# Patient Record
Sex: MALE | Race: ASIAN | NOT HISPANIC OR LATINO | URBAN - METROPOLITAN AREA
[De-identification: names, ages, dates, MRNs, and addresses within clinical notes are randomized per-mention and may not be internally consistent; named-entity substitution may affect disease eponyms.]

---

## 2020-03-29 ENCOUNTER — INPATIENT (INPATIENT)
Facility: HOSPITAL | Age: 50
LOS: 14 days | Discharge: HOME HEALTH | End: 2020-04-13
Attending: HOSPITALIST | Admitting: HOSPITALIST
Payer: COMMERCIAL

## 2020-03-29 VITALS
RESPIRATION RATE: 20 BRPM | TEMPERATURE: 100 F | SYSTOLIC BLOOD PRESSURE: 142 MMHG | HEART RATE: 102 BPM | OXYGEN SATURATION: 98 % | DIASTOLIC BLOOD PRESSURE: 72 MMHG

## 2020-03-29 DIAGNOSIS — E78.5 HYPERLIPIDEMIA, UNSPECIFIED: ICD-10-CM

## 2020-03-29 DIAGNOSIS — E87.1 HYPO-OSMOLALITY AND HYPONATREMIA: ICD-10-CM

## 2020-03-29 DIAGNOSIS — E11.9 TYPE 2 DIABETES MELLITUS WITHOUT COMPLICATIONS: ICD-10-CM

## 2020-03-29 DIAGNOSIS — B34.9 VIRAL INFECTION, UNSPECIFIED: ICD-10-CM

## 2020-03-29 LAB
ALBUMIN SERPL ELPH-MCNC: 4.3 G/DL — SIGNIFICANT CHANGE UP (ref 3.5–5.2)
ALP SERPL-CCNC: 65 U/L — SIGNIFICANT CHANGE UP (ref 30–115)
ALT FLD-CCNC: 37 U/L — SIGNIFICANT CHANGE UP (ref 0–41)
ANION GAP SERPL CALC-SCNC: 13 MMOL/L — SIGNIFICANT CHANGE UP (ref 7–14)
APPEARANCE UR: CLEAR — SIGNIFICANT CHANGE UP
APTT BLD: 35.2 SEC — SIGNIFICANT CHANGE UP (ref 27–39.2)
AST SERPL-CCNC: 39 U/L — SIGNIFICANT CHANGE UP (ref 0–41)
BASOPHILS # BLD AUTO: 0.01 K/UL — SIGNIFICANT CHANGE UP (ref 0–0.2)
BASOPHILS NFR BLD AUTO: 0.2 % — SIGNIFICANT CHANGE UP (ref 0–1)
BILIRUB SERPL-MCNC: 0.4 MG/DL — SIGNIFICANT CHANGE UP (ref 0.2–1.2)
BILIRUB UR-MCNC: NEGATIVE — SIGNIFICANT CHANGE UP
BUN SERPL-MCNC: 13 MG/DL — SIGNIFICANT CHANGE UP (ref 10–20)
CALCIUM SERPL-MCNC: 8.8 MG/DL — SIGNIFICANT CHANGE UP (ref 8.5–10.1)
CHLORIDE SERPL-SCNC: 89 MMOL/L — LOW (ref 98–110)
CO2 SERPL-SCNC: 26 MMOL/L — SIGNIFICANT CHANGE UP (ref 17–32)
COLOR SPEC: YELLOW — SIGNIFICANT CHANGE UP
CREAT SERPL-MCNC: 1 MG/DL — SIGNIFICANT CHANGE UP (ref 0.7–1.5)
DIFF PNL FLD: NEGATIVE — SIGNIFICANT CHANGE UP
EOSINOPHIL # BLD AUTO: 0 K/UL — SIGNIFICANT CHANGE UP (ref 0–0.7)
EOSINOPHIL NFR BLD AUTO: 0 % — SIGNIFICANT CHANGE UP (ref 0–8)
FLU A RESULT: NEGATIVE — SIGNIFICANT CHANGE UP
FLU A RESULT: NEGATIVE — SIGNIFICANT CHANGE UP
FLUAV AG NPH QL: NEGATIVE — SIGNIFICANT CHANGE UP
FLUBV AG NPH QL: NEGATIVE — SIGNIFICANT CHANGE UP
GLUCOSE BLDC GLUCOMTR-MCNC: 173 MG/DL — HIGH (ref 70–99)
GLUCOSE BLDC GLUCOMTR-MCNC: 195 MG/DL — HIGH (ref 70–99)
GLUCOSE BLDC GLUCOMTR-MCNC: 266 MG/DL — HIGH (ref 70–99)
GLUCOSE SERPL-MCNC: 234 MG/DL — HIGH (ref 70–99)
GLUCOSE UR QL: 500 MG/DL
HCT VFR BLD CALC: 44 % — SIGNIFICANT CHANGE UP (ref 42–52)
HGB BLD-MCNC: 13.6 G/DL — LOW (ref 14–18)
IMM GRANULOCYTES NFR BLD AUTO: 0.4 % — HIGH (ref 0.1–0.3)
INR BLD: 1.21 RATIO — SIGNIFICANT CHANGE UP (ref 0.65–1.3)
KETONES UR-MCNC: 40
LEUKOCYTE ESTERASE UR-ACNC: NEGATIVE — SIGNIFICANT CHANGE UP
LYMPHOCYTES # BLD AUTO: 0.51 K/UL — LOW (ref 1.2–3.4)
LYMPHOCYTES # BLD AUTO: 10.9 % — LOW (ref 20.5–51.1)
MCHC RBC-ENTMCNC: 19.6 PG — LOW (ref 27–31)
MCHC RBC-ENTMCNC: 30.9 G/DL — LOW (ref 32–37)
MCV RBC AUTO: 63.5 FL — LOW (ref 80–94)
MONOCYTES # BLD AUTO: 0.31 K/UL — SIGNIFICANT CHANGE UP (ref 0.1–0.6)
MONOCYTES NFR BLD AUTO: 6.6 % — SIGNIFICANT CHANGE UP (ref 1.7–9.3)
NEUTROPHILS # BLD AUTO: 3.84 K/UL — SIGNIFICANT CHANGE UP (ref 1.4–6.5)
NEUTROPHILS NFR BLD AUTO: 81.9 % — HIGH (ref 42.2–75.2)
NITRITE UR-MCNC: NEGATIVE — SIGNIFICANT CHANGE UP
NRBC # BLD: 0 /100 WBCS — SIGNIFICANT CHANGE UP (ref 0–0)
NT-PROBNP SERPL-SCNC: 29 PG/ML — SIGNIFICANT CHANGE UP (ref 0–300)
PH UR: 6.5 — SIGNIFICANT CHANGE UP (ref 5–8)
PLATELET # BLD AUTO: 121 K/UL — LOW (ref 130–400)
POTASSIUM SERPL-MCNC: 4.6 MMOL/L — SIGNIFICANT CHANGE UP (ref 3.5–5)
POTASSIUM SERPL-SCNC: 4.6 MMOL/L — SIGNIFICANT CHANGE UP (ref 3.5–5)
PROT SERPL-MCNC: 7.2 G/DL — SIGNIFICANT CHANGE UP (ref 6–8)
PROT UR-MCNC: NEGATIVE MG/DL — SIGNIFICANT CHANGE UP
PROTHROM AB SERPL-ACNC: 13.9 SEC — HIGH (ref 9.95–12.87)
RBC # BLD: 6.93 M/UL — HIGH (ref 4.7–6.1)
RBC # FLD: 14.1 % — SIGNIFICANT CHANGE UP (ref 11.5–14.5)
RSV RESULT: NEGATIVE — SIGNIFICANT CHANGE UP
RSV RNA RESP QL NAA+PROBE: NEGATIVE — SIGNIFICANT CHANGE UP
SODIUM SERPL-SCNC: 128 MMOL/L — LOW (ref 135–146)
SP GR SPEC: 1.01 — SIGNIFICANT CHANGE UP (ref 1.01–1.03)
TROPONIN T SERPL-MCNC: <0.01 NG/ML — SIGNIFICANT CHANGE UP
UROBILINOGEN FLD QL: 0.2 MG/DL — SIGNIFICANT CHANGE UP (ref 0.2–0.2)
WBC # BLD: 4.69 K/UL — LOW (ref 4.8–10.8)
WBC # FLD AUTO: 4.69 K/UL — LOW (ref 4.8–10.8)

## 2020-03-29 PROCEDURE — 71045 X-RAY EXAM CHEST 1 VIEW: CPT | Mod: 26

## 2020-03-29 PROCEDURE — 99285 EMERGENCY DEPT VISIT HI MDM: CPT

## 2020-03-29 PROCEDURE — 99497 ADVNCD CARE PLAN 30 MIN: CPT | Mod: 25

## 2020-03-29 PROCEDURE — 99223 1ST HOSP IP/OBS HIGH 75: CPT

## 2020-03-29 RX ORDER — DEXTROSE 50 % IN WATER 50 %
25 SYRINGE (ML) INTRAVENOUS ONCE
Refills: 0 | Status: DISCONTINUED | OUTPATIENT
Start: 2020-03-29 | End: 2020-04-13

## 2020-03-29 RX ORDER — SODIUM CHLORIDE 9 MG/ML
1000 INJECTION, SOLUTION INTRAVENOUS
Refills: 0 | Status: DISCONTINUED | OUTPATIENT
Start: 2020-03-29 | End: 2020-04-13

## 2020-03-29 RX ORDER — ACETAMINOPHEN 500 MG
650 TABLET ORAL EVERY 6 HOURS
Refills: 0 | Status: DISCONTINUED | OUTPATIENT
Start: 2020-03-29 | End: 2020-04-13

## 2020-03-29 RX ORDER — DEXTROSE 50 % IN WATER 50 %
12.5 SYRINGE (ML) INTRAVENOUS ONCE
Refills: 0 | Status: DISCONTINUED | OUTPATIENT
Start: 2020-03-29 | End: 2020-04-13

## 2020-03-29 RX ORDER — ONDANSETRON 8 MG/1
4 TABLET, FILM COATED ORAL ONCE
Refills: 0 | Status: COMPLETED | OUTPATIENT
Start: 2020-03-29 | End: 2020-03-29

## 2020-03-29 RX ORDER — INSULIN LISPRO 100/ML
VIAL (ML) SUBCUTANEOUS
Refills: 0 | Status: DISCONTINUED | OUTPATIENT
Start: 2020-03-29 | End: 2020-04-13

## 2020-03-29 RX ORDER — SODIUM CHLORIDE 9 MG/ML
2500 INJECTION, SOLUTION INTRAVENOUS ONCE
Refills: 0 | Status: COMPLETED | OUTPATIENT
Start: 2020-03-29 | End: 2020-03-29

## 2020-03-29 RX ORDER — ACETAMINOPHEN 500 MG
975 TABLET ORAL ONCE
Refills: 0 | Status: COMPLETED | OUTPATIENT
Start: 2020-03-29 | End: 2020-03-29

## 2020-03-29 RX ORDER — GLUCAGON INJECTION, SOLUTION 0.5 MG/.1ML
1 INJECTION, SOLUTION SUBCUTANEOUS ONCE
Refills: 0 | Status: DISCONTINUED | OUTPATIENT
Start: 2020-03-29 | End: 2020-04-13

## 2020-03-29 RX ORDER — HEPARIN SODIUM 5000 [USP'U]/ML
5000 INJECTION INTRAVENOUS; SUBCUTANEOUS EVERY 8 HOURS
Refills: 0 | Status: DISCONTINUED | OUTPATIENT
Start: 2020-03-29 | End: 2020-04-04

## 2020-03-29 RX ORDER — DEXTROSE 50 % IN WATER 50 %
15 SYRINGE (ML) INTRAVENOUS ONCE
Refills: 0 | Status: DISCONTINUED | OUTPATIENT
Start: 2020-03-29 | End: 2020-04-13

## 2020-03-29 RX ADMIN — Medication 650 MILLIGRAM(S): at 11:03

## 2020-03-29 RX ADMIN — ONDANSETRON 4 MILLIGRAM(S): 8 TABLET, FILM COATED ORAL at 05:42

## 2020-03-29 RX ADMIN — SODIUM CHLORIDE 2500 MILLILITER(S): 9 INJECTION, SOLUTION INTRAVENOUS at 05:42

## 2020-03-29 RX ADMIN — HEPARIN SODIUM 5000 UNIT(S): 5000 INJECTION INTRAVENOUS; SUBCUTANEOUS at 13:17

## 2020-03-29 RX ADMIN — Medication 650 MILLIGRAM(S): at 22:08

## 2020-03-29 RX ADMIN — Medication 975 MILLIGRAM(S): at 05:26

## 2020-03-29 RX ADMIN — Medication 1: at 12:40

## 2020-03-29 NOTE — ED PROVIDER NOTE - OBJECTIVE STATEMENT
50 year old male no past medical history presenting with fever/weakness/nausea x 4 days. Patient states he has been taking tylenol with no relief. no covid + contacts or recent travel. patient denies cough, chest pain, shortness of breath, abd pain, v/d, dysuria. sx moderate, worsening, no pall/prov factors.

## 2020-03-29 NOTE — GOALS OF CARE CONVERSATION - ADVANCED CARE PLANNING - CONVERSATION DETAILS
L/m on v/m no available late appt. Can call tomorrow for cancelations or go icc today  
PT'S DAUGHTER CALLING, SAYS SHE WAS PRESCRIBED A STEROID PACK AT HOSPITAL, FINISHED MEDS BUT STILL SICK AND RUNNING A FEVER.   NEEDS LATE DAY APPT AS SOON AS POSSIBLE  
Discussed likely covid and potential for decompensation. wants to remain full code.

## 2020-03-29 NOTE — ED PROVIDER NOTE - PHYSICAL EXAMINATION
Physical Exam    Vital Signs: I have reviewed the initial vital signs  Constitutional: well-nourished, appears stated age, clammy, uncomfortable  EENT: Conjunctiva pink, Sclera clear, PERRLA, EOMI. Mucous membranes moist, no exudates or lesions noted, uvula midline.  Cardiovascular: S1 and S2 present, regular rate, regular rhythm. Well perfused extremities, no peripheral edema  Respiratory: unlabored respiratory effort, clear to auscultation bilaterally no wheezing, rales or rhonchi  Gastrointestinal: soft, non-tender abdomen. No guarding or rebound tenderness  Musculoskeletal: supple nontender neck, no midline tenderness, no joint pain  Integumentary: warm, dry, no rash  Neurologic: A & O x 3, all extremities’ motor and sensory functions grossly intact  Psychiatric: appropriate mood, appropriate affect

## 2020-03-29 NOTE — H&P ADULT - NSHPSOCIALHISTORY_GEN_ALL_CORE
pt , lives with wife and children. Employed with [employer] in [place]. Denies any tobacco, EtOH or illicit drug use.

## 2020-03-29 NOTE — H&P ADULT - NSHPPHYSICALEXAM_GEN_ALL_CORE
PHYSICAL EXAM:    General: AAOx3, NAD    HEENT: NCAT, no JVD    Thorax: CTA b/l    Heart: normal S1/S2, rrr, no m/r/g    GI: BS normoactive, s/nt/nd    Extremities: no c/c/e, wwp, 2+ b/l UE/LE distal pulses    Neurologic: no focal deficits

## 2020-03-29 NOTE — H&P ADULT - PROBLEM SELECTOR PLAN 3
- hold oral regimen, continue serial FSGs w/ RISS coverage (note: pt unsure what oral hypoglycemics he takes at home, states his wife picks up his meds for him but he is unsure which pharmacy she uses)

## 2020-03-29 NOTE — H&P ADULT - ASSESSMENT
49yo M w/ PMH and HPI as above p/w sx c/f active COVID-19 infection 51yo M w/ PMH and HPI as above p/w s/sx c/f active COVID-19 infection

## 2020-03-29 NOTE — ED PROVIDER NOTE - NS ED ROS FT
Review of Systems         Constitutional: See HPI       EENT:  (-) sore throat (-) congestion       Cardiovascular: (-) chest pain (-) syncope       Respiratory: (-) cough, (-) shortness of breath       Gastrointestinal: (-) abdominal pain (-) vomiting (-) diarrhea (+) nausea (-) constipation       Genitourinary: (-) dysuria (-) frequency (-) hematuria       Musculoskeletal: (-) neck pain (-) back pain (-) joint pain       Integumentary: (-) rash       Neurological: (-) headache (-) altered mental status (-) dizziness (-) paresthesias       Psych: (-) psych history

## 2020-03-29 NOTE — H&P ADULT - PROBLEM SELECTOR PLAN 1
#r/o COVID-19 infection  - f/u COVID-19 result #r/o COVID-19 infection  - f/u COVID-19 result  - continue symptomatic management, including tylenol and zofran PRN  - continue to monitor RR/O2 sat, provide supplemental O2 as needed

## 2020-03-29 NOTE — H&P ADULT - HISTORY OF PRESENT ILLNESS
49yo M w/ PMH of NIDDM, HLD p/w subjective fever and HA x5 days. Also reporting generalized weakness over same period. Denies any CP, palpitations, SOB, n/v/d, abdominal pain, recent travel, or other acute/associated symptoms. Reports several of his co-workers (employed by [employer] in [employer]) have been ill recently but unsure whether they're COVID-positive. Denies any other known sick contacts.

## 2020-03-29 NOTE — H&P ADULT - NSHPLABSRESULTS_GEN_ALL_CORE
13.6   4.69  )-----------( 121      ( 29 Mar 2020 05:30 )             44.0     03-29    128<L>  |  89<L>  |  13  ----------------------------<  234<H>  4.6   |  26  |  1.0    Ca    8.8      29 Mar 2020 05:30    TPro  7.2  /  Alb  4.3  /  TBili  0.4  /  DBili  x   /  AST  39  /  ALT  37  /  AlkPhos  65  03-29      CAPILLARY BLOOD GLUCOSE        CARDIAC MARKERS ( 29 Mar 2020 05:30 )  x     / <0.01 ng/mL / x     / x     / x    < from: Xray Chest 1 View-PORTABLE IMMEDIATE (03.29.20 @ 06:52) >    Impression:      Subtle bilateral reticular peripheral opacities consistent with infectious etiologies in the appropriate clinical setting

## 2020-03-29 NOTE — ED PROVIDER NOTE - ATTENDING CONTRIBUTION TO CARE
I personally evaluated the patient. I reviewed the Resident’s or Physician Assistant’s note (as assigned above), and agree with the findings and plan except as documented in my note.  Chart reviewed. 49 y/o presents with fever, weakness and nausea for 4 days. Denies cough or SOB. Exam shows alert patient in no distress, HEENT NCAT, lungs clear, RR S1S2, abdomen soft NT +BS, no CCE.

## 2020-03-29 NOTE — ED PROVIDER NOTE - CLINICAL SUMMARY MEDICAL DECISION MAKING FREE TEXT BOX
Labs noted for WBC 4k, Na 128. CXR bilateral infiltrate. EKG no acute changes. Still feels weak despite IVF, Zofran. Will admit.

## 2020-03-29 NOTE — ED ADULT NURSE NOTE - NSIMPLEMENTINTERV_GEN_ALL_ED
Implemented All Universal Safety Interventions:  [place] to call system. Call bell, personal items and telephone within reach. Instruct patient to call for assistance. Room bathroom lighting operational. Non-slip footwear when patient is off stretcher. Physically safe environment: no spills, clutter or unnecessary equipment. Stretcher in lowest position, wheels locked, appropriate side rails in place.

## 2020-03-30 LAB
ANION GAP SERPL CALC-SCNC: 17 MMOL/L — HIGH (ref 7–14)
BASOPHILS # BLD AUTO: 0.01 K/UL — SIGNIFICANT CHANGE UP (ref 0–0.2)
BASOPHILS NFR BLD AUTO: 0.2 % — SIGNIFICANT CHANGE UP (ref 0–1)
BUN SERPL-MCNC: 13 MG/DL — SIGNIFICANT CHANGE UP (ref 10–20)
CALCIUM SERPL-MCNC: 8.3 MG/DL — LOW (ref 8.5–10.1)
CHLORIDE SERPL-SCNC: 93 MMOL/L — LOW (ref 98–110)
CO2 SERPL-SCNC: 23 MMOL/L — SIGNIFICANT CHANGE UP (ref 17–32)
CREAT SERPL-MCNC: 0.9 MG/DL — SIGNIFICANT CHANGE UP (ref 0.7–1.5)
CULTURE RESULTS: NO GROWTH — SIGNIFICANT CHANGE UP
EOSINOPHIL # BLD AUTO: 0 K/UL — SIGNIFICANT CHANGE UP (ref 0–0.7)
EOSINOPHIL NFR BLD AUTO: 0 % — SIGNIFICANT CHANGE UP (ref 0–8)
ESTIMATED AVERAGE GLUCOSE: 229 MG/DL — HIGH (ref 68–114)
GLUCOSE BLDC GLUCOMTR-MCNC: 193 MG/DL — HIGH (ref 70–99)
GLUCOSE BLDC GLUCOMTR-MCNC: 244 MG/DL — HIGH (ref 70–99)
GLUCOSE SERPL-MCNC: 205 MG/DL — HIGH (ref 70–99)
HBA1C BLD-MCNC: 9.6 % — HIGH (ref 4–5.6)
HCT VFR BLD CALC: 40.7 % — LOW (ref 42–52)
HGB BLD-MCNC: 12.5 G/DL — LOW (ref 14–18)
IMM GRANULOCYTES NFR BLD AUTO: 0.4 % — HIGH (ref 0.1–0.3)
LYMPHOCYTES # BLD AUTO: 0.64 K/UL — LOW (ref 1.2–3.4)
LYMPHOCYTES # BLD AUTO: 13.3 % — LOW (ref 20.5–51.1)
MCHC RBC-ENTMCNC: 19.5 PG — LOW (ref 27–31)
MCHC RBC-ENTMCNC: 30.7 G/DL — LOW (ref 32–37)
MCV RBC AUTO: 63.6 FL — LOW (ref 80–94)
MONOCYTES # BLD AUTO: 0.26 K/UL — SIGNIFICANT CHANGE UP (ref 0.1–0.6)
MONOCYTES NFR BLD AUTO: 5.4 % — SIGNIFICANT CHANGE UP (ref 1.7–9.3)
NEUTROPHILS # BLD AUTO: 3.89 K/UL — SIGNIFICANT CHANGE UP (ref 1.4–6.5)
NEUTROPHILS NFR BLD AUTO: 80.7 % — HIGH (ref 42.2–75.2)
NRBC # BLD: 0 /100 WBCS — SIGNIFICANT CHANGE UP (ref 0–0)
PLATELET # BLD AUTO: 117 K/UL — LOW (ref 130–400)
POTASSIUM SERPL-MCNC: 3.8 MMOL/L — SIGNIFICANT CHANGE UP (ref 3.5–5)
POTASSIUM SERPL-SCNC: 3.8 MMOL/L — SIGNIFICANT CHANGE UP (ref 3.5–5)
RBC # BLD: 6.4 M/UL — HIGH (ref 4.7–6.1)
RBC # FLD: 13.2 % — SIGNIFICANT CHANGE UP (ref 11.5–14.5)
SARS-COV-2 RNA SPEC QL NAA+PROBE: DETECTED
SODIUM SERPL-SCNC: 133 MMOL/L — LOW (ref 135–146)
SPECIMEN SOURCE: SIGNIFICANT CHANGE UP
WBC # BLD: 4.82 K/UL — SIGNIFICANT CHANGE UP (ref 4.8–10.8)
WBC # FLD AUTO: 4.82 K/UL — SIGNIFICANT CHANGE UP (ref 4.8–10.8)

## 2020-03-30 PROCEDURE — 99233 SBSQ HOSP IP/OBS HIGH 50: CPT

## 2020-03-30 RX ORDER — MAGNESIUM SULFATE 500 MG/ML
1 VIAL (ML) INJECTION ONCE
Refills: 0 | Status: COMPLETED | OUTPATIENT
Start: 2020-03-30 | End: 2020-03-30

## 2020-03-30 RX ORDER — ACETAMINOPHEN 500 MG
650 TABLET ORAL EVERY 6 HOURS
Refills: 0 | Status: COMPLETED | OUTPATIENT
Start: 2020-03-30 | End: 2020-03-31

## 2020-03-30 RX ADMIN — Medication 650 MILLIGRAM(S): at 12:47

## 2020-03-30 RX ADMIN — Medication 100 GRAM(S): at 11:30

## 2020-03-30 RX ADMIN — HEPARIN SODIUM 5000 UNIT(S): 5000 INJECTION INTRAVENOUS; SUBCUTANEOUS at 22:23

## 2020-03-30 RX ADMIN — Medication 2: at 17:42

## 2020-03-30 RX ADMIN — Medication 650 MILLIGRAM(S): at 06:09

## 2020-03-30 RX ADMIN — Medication 650 MILLIGRAM(S): at 23:30

## 2020-03-30 RX ADMIN — Medication 650 MILLIGRAM(S): at 18:34

## 2020-03-30 NOTE — PROGRESS NOTE ADULT - ASSESSMENT
51yo M w/ PMH and HPI as above p/w s/sx c/f active COVID-19 infection    Suspect COVID-19  - PCR pending  - requiring supplement o2, currently on 3L    Frontotemporal headaches  - likely due to covid  - d/w neurology can hold off CT scan  - will trial magnesium as tylenol not helping, cannot give steroids, want to avoid reglan in case pt requires plaquenil (don't want to prolong qtc)    Hyperlipidemia  - pt unsure what medications he takes for HLD, doesn't know pharmacy  - can hold off po agents for now    DM II  - pt unsure of home meds, hold oral regimen, continue serial FSGs w/ RISS coverage     Hyponatremia.  - resolving  - will monitor     Full Code  From home  DVT px    #Progress Note Handoff:  Pending (specify):  resolution of hypoxia  Family discussion: discussed HA, trial mg, covid  Disposition: Home___x/SNF___/Other________/Unknown at this time________

## 2020-03-30 NOTE — PROGRESS NOTE ADULT - SUBJECTIVE AND OBJECTIVE BOX
CHIEF COMPLAINT:    Patient is a 50y old  Male who presents with a chief complaint of HA, fever x5 days    INTERVAL HPI/OVERNIGHT EVENTS:    Patient seen and examined at bedside. No acute overnight events occurred.    ROS: Reports severe headache. All other systems are negative.    Vital Signs:    T(F): 100.4 (03-30-20 @ 07:25), Max: 102.8 (03-29-20 @ 17:00)  HR: 98 (03-30-20 @ 05:19) (94 - 106)  BP: 120/71 (03-30-20 @ 05:19) (120/71 - 144/85)  RR: 18 (03-30-20 @ 05:19) (18 - 18)  SpO2: 94% (03-29-20 @ 21:34) (94% - 98%)    POCT Blood Glucose.: 173 mg/dL (29 Mar 2020 22:01)  POCT Blood Glucose.: 266 mg/dL (29 Mar 2020 17:38)  POCT Blood Glucose.: 195 mg/dL (29 Mar 2020 11:52)      PHYSICAL EXAM:  GENERAL:  NAD  SKIN: No rashes or lesions  HEENT: Atraumatic. Normocephalic. Anicteric  NECK:  No JVD.   PULMONARY: Clear to ausculation bilaterally. No wheezing. No rales  CVS: Normal S1, S2. Regular rate and rhythm. No murmurs.  ABDOMEN/GI: Soft, Nontender, Nondistended; Bowel sounds are present  EXTREMITIES:  No edema B/L LE.  NEUROLOGIC:  No motor deficit.  PSYCH: Alert & oriented x 3, normal affect    Consultant(s) Notes Reviewed:  [x ] YES  [ ] NO  Care Discussed with Consultants/Other Providers [ x] YES  [ ] NO-neurology    LABS:                        13.6   4.69  )-----------( 121      ( 29 Mar 2020 05:30 )             44.0     03-29    128<L>  |  89<L>  |  13  ----------------------------<  234<H>  4.6   |  26  |  1.0    Ca    8.8      29 Mar 2020 05:30    TPro  7.2  /  Alb  4.3  /  TBili  0.4  /  DBili  x   /  AST  39  /  ALT  37  /  AlkPhos  65  03-29    PT/INR - ( 29 Mar 2020 06:30 )   PT: 13.90 sec;   INR: 1.21 ratio         PTT - ( 29 Mar 2020 06:30 )  PTT:35.2 sec  Serum Pro-Brain Natriuretic Peptide: 29 pg/mL (03-29-20 @ 05:30)    Trop <0.01, CKMB --, CK --, 03-29-20 @ 05:30        RADIOLOGY & ADDITIONAL TESTS:  No new images    Medications:  Standing  dextrose 5%. 1000 milliLiter(s) IV Continuous <Continuous>  dextrose 50% Injectable 12.5 Gram(s) IV Push once  dextrose 50% Injectable 25 Gram(s) IV Push once  dextrose 50% Injectable 25 Gram(s) IV Push once  heparin  Injectable 5000 Unit(s) SubCutaneous every 8 hours  insulin lispro (HumaLOG) corrective regimen sliding scale   SubCutaneous three times a day before meals  magnesium sulfate  IVPB 1 Gram(s) IV Intermittent once    PRN Meds  acetaminophen   Tablet .. 650 milliGRAM(s) Oral every 6 hours PRN  dextrose 40% Gel 15 Gram(s) Oral once PRN  glucagon  Injectable 1 milliGRAM(s) IntraMuscular once PRN      Case discussed with resident  Care discussed with pt

## 2020-03-31 DIAGNOSIS — J12.89 OTHER VIRAL PNEUMONIA: ICD-10-CM

## 2020-03-31 DIAGNOSIS — A41.9 SEPSIS, UNSPECIFIED ORGANISM: ICD-10-CM

## 2020-03-31 LAB
ALBUMIN SERPL ELPH-MCNC: 3.7 G/DL — SIGNIFICANT CHANGE UP (ref 3.5–5.2)
ALP SERPL-CCNC: 74 U/L — SIGNIFICANT CHANGE UP (ref 30–115)
ALT FLD-CCNC: 42 U/L — HIGH (ref 0–41)
ANION GAP SERPL CALC-SCNC: 14 MMOL/L — SIGNIFICANT CHANGE UP (ref 7–14)
AST SERPL-CCNC: 41 U/L — SIGNIFICANT CHANGE UP (ref 0–41)
BASOPHILS # BLD AUTO: 0.01 K/UL — SIGNIFICANT CHANGE UP (ref 0–0.2)
BASOPHILS NFR BLD AUTO: 0.2 % — SIGNIFICANT CHANGE UP (ref 0–1)
BILIRUB SERPL-MCNC: 0.7 MG/DL — SIGNIFICANT CHANGE UP (ref 0.2–1.2)
BUN SERPL-MCNC: 14 MG/DL — SIGNIFICANT CHANGE UP (ref 10–20)
CALCIUM SERPL-MCNC: 8.2 MG/DL — LOW (ref 8.5–10.1)
CHLORIDE SERPL-SCNC: 93 MMOL/L — LOW (ref 98–110)
CO2 SERPL-SCNC: 25 MMOL/L — SIGNIFICANT CHANGE UP (ref 17–32)
CREAT SERPL-MCNC: 0.8 MG/DL — SIGNIFICANT CHANGE UP (ref 0.7–1.5)
EOSINOPHIL # BLD AUTO: 0 K/UL — SIGNIFICANT CHANGE UP (ref 0–0.7)
EOSINOPHIL NFR BLD AUTO: 0 % — SIGNIFICANT CHANGE UP (ref 0–8)
GLUCOSE BLDC GLUCOMTR-MCNC: 211 MG/DL — HIGH (ref 70–99)
GLUCOSE BLDC GLUCOMTR-MCNC: 227 MG/DL — HIGH (ref 70–99)
GLUCOSE BLDC GLUCOMTR-MCNC: 241 MG/DL — HIGH (ref 70–99)
GLUCOSE BLDC GLUCOMTR-MCNC: 282 MG/DL — HIGH (ref 70–99)
GLUCOSE SERPL-MCNC: 304 MG/DL — HIGH (ref 70–99)
HCT VFR BLD CALC: 38.5 % — LOW (ref 42–52)
HGB BLD-MCNC: 12.2 G/DL — LOW (ref 14–18)
IMM GRANULOCYTES NFR BLD AUTO: 0.3 % — SIGNIFICANT CHANGE UP (ref 0.1–0.3)
LYMPHOCYTES # BLD AUTO: 0.73 K/UL — LOW (ref 1.2–3.4)
LYMPHOCYTES # BLD AUTO: 12.8 % — LOW (ref 20.5–51.1)
MCHC RBC-ENTMCNC: 19.8 PG — LOW (ref 27–31)
MCHC RBC-ENTMCNC: 31.7 G/DL — LOW (ref 32–37)
MCV RBC AUTO: 62.5 FL — LOW (ref 80–94)
MONOCYTES # BLD AUTO: 0.39 K/UL — SIGNIFICANT CHANGE UP (ref 0.1–0.6)
MONOCYTES NFR BLD AUTO: 6.8 % — SIGNIFICANT CHANGE UP (ref 1.7–9.3)
NEUTROPHILS # BLD AUTO: 4.57 K/UL — SIGNIFICANT CHANGE UP (ref 1.4–6.5)
NEUTROPHILS NFR BLD AUTO: 79.9 % — HIGH (ref 42.2–75.2)
NRBC # BLD: 0 /100 WBCS — SIGNIFICANT CHANGE UP (ref 0–0)
PLATELET # BLD AUTO: 131 K/UL — SIGNIFICANT CHANGE UP (ref 130–400)
POTASSIUM SERPL-MCNC: 4.2 MMOL/L — SIGNIFICANT CHANGE UP (ref 3.5–5)
POTASSIUM SERPL-SCNC: 4.2 MMOL/L — SIGNIFICANT CHANGE UP (ref 3.5–5)
PROT SERPL-MCNC: 6.5 G/DL — SIGNIFICANT CHANGE UP (ref 6–8)
RBC # BLD: 6.16 M/UL — HIGH (ref 4.7–6.1)
RBC # FLD: 13.4 % — SIGNIFICANT CHANGE UP (ref 11.5–14.5)
SODIUM SERPL-SCNC: 132 MMOL/L — LOW (ref 135–146)
WBC # BLD: 5.72 K/UL — SIGNIFICANT CHANGE UP (ref 4.8–10.8)
WBC # FLD AUTO: 5.72 K/UL — SIGNIFICANT CHANGE UP (ref 4.8–10.8)

## 2020-03-31 PROCEDURE — 99233 SBSQ HOSP IP/OBS HIGH 50: CPT

## 2020-03-31 RX ORDER — HYDROXYCHLOROQUINE SULFATE 200 MG
TABLET ORAL
Refills: 0 | Status: COMPLETED | OUTPATIENT
Start: 2020-03-31 | End: 2020-04-04

## 2020-03-31 RX ORDER — HYDROXYCHLOROQUINE SULFATE 200 MG
400 TABLET ORAL EVERY 12 HOURS
Refills: 0 | Status: COMPLETED | OUTPATIENT
Start: 2020-03-31 | End: 2020-03-31

## 2020-03-31 RX ORDER — HYDROXYCHLOROQUINE SULFATE 200 MG
200 TABLET ORAL EVERY 12 HOURS
Refills: 0 | Status: COMPLETED | OUTPATIENT
Start: 2020-04-01 | End: 2020-04-04

## 2020-03-31 RX ORDER — INSULIN LISPRO 100/ML
4 VIAL (ML) SUBCUTANEOUS
Refills: 0 | Status: DISCONTINUED | OUTPATIENT
Start: 2020-03-31 | End: 2020-04-07

## 2020-03-31 RX ORDER — INSULIN GLARGINE 100 [IU]/ML
12 INJECTION, SOLUTION SUBCUTANEOUS AT BEDTIME
Refills: 0 | Status: DISCONTINUED | OUTPATIENT
Start: 2020-03-31 | End: 2020-04-07

## 2020-03-31 RX ADMIN — Medication 4 UNIT(S): at 17:54

## 2020-03-31 RX ADMIN — Medication 650 MILLIGRAM(S): at 11:06

## 2020-03-31 RX ADMIN — Medication 400 MILLIGRAM(S): at 22:10

## 2020-03-31 RX ADMIN — Medication 2: at 17:54

## 2020-03-31 RX ADMIN — INSULIN GLARGINE 12 UNIT(S): 100 INJECTION, SOLUTION SUBCUTANEOUS at 22:11

## 2020-03-31 RX ADMIN — HEPARIN SODIUM 5000 UNIT(S): 5000 INJECTION INTRAVENOUS; SUBCUTANEOUS at 22:11

## 2020-03-31 RX ADMIN — Medication 400 MILLIGRAM(S): at 12:06

## 2020-03-31 RX ADMIN — HEPARIN SODIUM 5000 UNIT(S): 5000 INJECTION INTRAVENOUS; SUBCUTANEOUS at 05:17

## 2020-03-31 RX ADMIN — Medication 3: at 12:06

## 2020-03-31 RX ADMIN — Medication 2: at 08:14

## 2020-03-31 RX ADMIN — Medication 650 MILLIGRAM(S): at 06:17

## 2020-03-31 RX ADMIN — Medication 650 MILLIGRAM(S): at 15:23

## 2020-03-31 NOTE — PROGRESS NOTE ADULT - ASSESSMENT
49yo M w/ PMH and HPI as above p/w s/sx c/f active COVID-19 infection    COVID-19  - c/w plaquenil  - qtc 436  - pt becomes hypoxic to 85% as soon as o2 taken off. Pt requires continued supplement o2 and monitoring.    Frontotemporal headaches  - resolved  - magnesium helped with HA    Hyperlipidemia  - pt unsure what medications he takes for HLD, doesn't know pharmacy  - can hold off po agents for now    DM II  - pt unsure of home meds, hold oral regimen, continue serial FSGs w/ RISS coverage   - [email or web address] started  - uncontrolled     Hyponatremia.  - resolving  - will monitor     Full Code  From home  DVT px    #Progress Note Handoff:  Pending (specify):  resolution of hypoxia  Family discussion: discussed HA, trial mg, covid  Disposition: Home___x/SNF___/Other________/Unknown at this time________

## 2020-03-31 NOTE — PROGRESS NOTE ADULT - SUBJECTIVE AND OBJECTIVE BOX
CHIEF COMPLAINT:    Patient is a 50y old  Male who presents with a chief complaint of HA, fever x5 days     INTERVAL HPI/OVERNIGHT EVENTS:    Patient seen and examined at bedside. No acute overnight events occurred.    ROS: Headache resolved. All other systems are negative.    Vital Signs:    T(F): 102 (03-31-20 @ 12:55), Max: 102.9 (03-30-20 @ 22:05)  HR: 99 (03-31-20 @ 06:00) (98 - 99)  BP: 129/71 (03-31-20 @ 06:00) (129/71 - 136/76)  RR: 18 (03-31-20 @ 06:00) (18 - 18)  SpO2: 94% (03-31-20 @ 12:55) (94% - 96%)      POCT Blood Glucose.: 282 mg/dL (31 Mar 2020 11:11)  POCT Blood Glucose.: 241 mg/dL (31 Mar 2020 07:59)  POCT Blood Glucose.: 193 mg/dL (30 Mar 2020 21:43)  POCT Blood Glucose.: 244 mg/dL (30 Mar 2020 16:47)      PHYSICAL EXAM:  GENERAL:  NAD  SKIN: No rashes or lesions  HEENT: Atraumatic. Normocephalic. Anicteric  NECK:  No JVD.   PULMONARY: Clear to ausculation bilaterally. No wheezing. No rales  CVS: Normal S1, S2. Regular rate and rhythm. No murmurs.  ABDOMEN/GI: Soft, Nontender, Nondistended; Bowel sounds are present  EXTREMITIES:  No edema B/L LE.  NEUROLOGIC:  No motor deficit.  PSYCH: Alert & oriented x 3, normal affect    Consultant(s) Notes Reviewed:  [x ] YES  [ ] NO  Care Discussed with Consultants/Other Providers [ x] YES  [ ] NO    LABS:                        12.2   5.72  )-----------( 131      ( 31 Mar 2020 10:43 )             38.5     03-31    132<L>  |  93<L>  |  14  ----------------------------<  304<H>  4.2   |  25  |  0.8    Ca    8.2<L>      31 Mar 2020 10:43    TPro  6.5  /  Alb  3.7  /  TBili  0.7  /  DBili  x   /  AST  41  /  ALT  42<H>  /  AlkPhos  74  03-31      Serum Pro-Brain Natriuretic Peptide: 29 pg/mL (03-29-20 @ 05:30)    Trop <0.01, CKMB --, CK --, 03-29-20 @ 05:30      Culture - Blood (collected 29 Mar 2020 05:30)  Source: .Blood Blood-Peripheral  Preliminary Report (30 Mar 2020 17:02):    No growth to date.    Culture - Blood (collected 29 Mar 2020 05:30)  Source: .Blood Blood-Peripheral  Preliminary Report (30 Mar 2020 17:02):    No growth to date.    Culture - Urine (collected 29 Mar 2020 05:13)  Source: .Urine Clean Catch (Midstream)  Final Report (30 Mar 2020 12:15):    No growth        RADIOLOGY & ADDITIONAL TESTS:  Imaging or report Personally Reviewed:  [ ] YES  [ ] NO    EKG reviewed independently    Medications:  Standing  dextrose 5%. 1000 milliLiter(s) IV Continuous <Continuous>  dextrose 50% Injectable 12.5 Gram(s) IV Push once  dextrose 50% Injectable 25 Gram(s) IV Push once  dextrose 50% Injectable 25 Gram(s) IV Push once  heparin  Injectable 5000 Unit(s) SubCutaneous every 8 hours  hydroxychloroquine 400 milliGRAM(s) Oral every 12 hours  hydroxychloroquine   Oral   insulin lispro (HumaLOG) corrective regimen sliding scale   SubCutaneous three times a day before meals    PRN Meds  acetaminophen   Tablet .. 650 milliGRAM(s) Oral every 6 hours PRN  acetaminophen   Tablet .. 650 milliGRAM(s) Oral every 6 hours PRN  dextrose 40% Gel 15 Gram(s) Oral once PRN  glucagon  Injectable 1 milliGRAM(s) IntraMuscular once PRN

## 2020-03-31 NOTE — CONSULT NOTE ADULT - SUBJECTIVE AND OBJECTIVE BOX
BETSY DAVILA  50y, Male  Allergy: No Known Allergies      CHIEF COMPLAINT: HA, fever x5 days (30 Mar 2020 10:35)      HPI:  51yo M w/ PMH of NIDDM, HLD p/w subjective fever and HA x5 days. Also reporting generalized weakness over same period. Denies any CP, palpitations, SOB, n/v/d, abdominal pain, recent travel, or other acute/associated symptoms. Reports several of his co-workers (employed by [employer] in [place]) have been ill recently but unsure whether they're COVID-positive. Denies any other known sick contacts. (29 Mar 2020 09:41)    FAMILY HISTORY:  No pertinent family history in first degree relatives    PAST MEDICAL & SURGICAL HISTORY:  Hyperlipidemia  DM (diabetes mellitus)  No significant past surgical history    Social History:  pt , lives with wife and children. Employed with [employer] in [place]. Denies any tobacco, EtOH or illicit drug use. (29 Mar 2020 09:41)        ROS  10 system review - SOB + weakness +  VITALS:  T(F): 102.4, Max: 102.9 (03-30-20 @ 22:05)  HR: 99  BP: 129/71  RR: 18Vital Signs Last 24 Hrs  T(C): 39.1 (31 Mar 2020 06:00), Max: 39.4 (30 Mar 2020 22:05)  T(F): 102.4 (31 Mar 2020 06:00), Max: 102.9 (30 Mar 2020 22:05)  HR: 99 (31 Mar 2020 06:00) (98 - 99)  BP: 129/71 (31 Mar 2020 06:00) (129/71 - 136/76)  BP(mean): --  RR: 18 (31 Mar 2020 06:00) (18 - 18)  SpO2: 95% (31 Mar 2020 06:20) (95% - 96%)    PHYSICAL EXAM:  Gen: NAD, resting in bed  HEENT: Normocephalic, atraumatic  Neck: supple, no lymphadenopathy  CV:s1 s 2+   Lungs: decreased BS   Abdomen: Soft, BS present  Ext: Warm, well perfused  Neuro: non focal, awake  Skin: no rash, no erythema    TESTS & MEASUREMENTS:                        12.5   4.82  )-----------( 117      ( 30 Mar 2020 08:15 )             40.7     03-30    133<L>  |  93<L>  |  13  ----------------------------<  205<H>  3.8   |  23  |  0.9    Ca    8.3<L>      30 Mar 2020 08:15              Culture - Blood (collected 03-29-20 @ 05:30)  Source: .Blood Blood-Peripheral  Preliminary Report (03-30-20 @ 17:02):    No growth to date.    Culture - Blood (collected 03-29-20 @ 05:30)  Source: .Blood Blood-Peripheral  Preliminary Report (03-30-20 @ 17:02):    No growth to date.    Culture - Urine (collected 03-29-20 @ 05:13)  Source: .Urine Clean Catch (Midstream)  Final Report (03-30-20 @ 12:15):    No growth            INFECTIOUS DISEASES TESTING      RADIOLOGY & ADDITIONAL TESTS:  I have personally reviewed the last Chest xray  CXR - bilat opacities      CARDIOLOGY TESTING  12 Lead ECG:   Ventricular Rate 96 BPM    Atrial Rate 96 BPM    P-R Interval 162 ms    QRS Duration 94 ms    Q-T Interval 330 ms    QTC Calculation(Bezet) 416 ms    P Axis 22 degrees    R Axis 16 degrees    T Axis 12 degrees    Diagnosis Line Normal sinus rhythm  Possible Anteroseptal infarct    Confirmed by GLENIS VALENCIA MD [id number]) on 3/29/2020 9:18:54 AM (03-29-20 @ 05:29)      MEDICATIONS  dextrose 5%. 1000  dextrose 50% Injectable 12.5  dextrose 50% Injectable 25  dextrose 50% Injectable 25  heparin  Injectable 5000  hydroxychloroquine   insulin lispro (HumaLOG) corrective regimen sliding scale       ANTIBIOTICS:  hydroxychloroquine   Oral

## 2020-04-01 LAB
GLUCOSE BLDC GLUCOMTR-MCNC: 136 MG/DL — HIGH (ref 70–99)
GLUCOSE BLDC GLUCOMTR-MCNC: 182 MG/DL — HIGH (ref 70–99)
GLUCOSE BLDC GLUCOMTR-MCNC: 194 MG/DL — HIGH (ref 70–99)
GLUCOSE BLDC GLUCOMTR-MCNC: 224 MG/DL — HIGH (ref 70–99)

## 2020-04-01 PROCEDURE — 99233 SBSQ HOSP IP/OBS HIGH 50: CPT | Mod: GC

## 2020-04-01 RX ORDER — SODIUM CHLORIDE 9 MG/ML
500 INJECTION INTRAMUSCULAR; INTRAVENOUS; SUBCUTANEOUS
Refills: 0 | Status: DISCONTINUED | OUTPATIENT
Start: 2020-04-01 | End: 2020-04-03

## 2020-04-01 RX ADMIN — Medication 650 MILLIGRAM(S): at 10:58

## 2020-04-01 RX ADMIN — Medication 200 MILLIGRAM(S): at 09:26

## 2020-04-01 RX ADMIN — SODIUM CHLORIDE 75 MILLILITER(S): 9 INJECTION INTRAMUSCULAR; INTRAVENOUS; SUBCUTANEOUS at 13:52

## 2020-04-01 RX ADMIN — Medication 1: at 13:45

## 2020-04-01 RX ADMIN — Medication 650 MILLIGRAM(S): at 05:06

## 2020-04-01 RX ADMIN — Medication 2: at 08:37

## 2020-04-01 RX ADMIN — INSULIN GLARGINE 12 UNIT(S): 100 INJECTION, SOLUTION SUBCUTANEOUS at 23:21

## 2020-04-01 RX ADMIN — Medication 4 UNIT(S): at 08:36

## 2020-04-01 RX ADMIN — Medication 4 UNIT(S): at 13:44

## 2020-04-01 RX ADMIN — Medication 200 MILLIGRAM(S): at 23:20

## 2020-04-01 RX ADMIN — Medication 1: at 17:18

## 2020-04-01 RX ADMIN — Medication 4 UNIT(S): at 17:18

## 2020-04-01 RX ADMIN — HEPARIN SODIUM 5000 UNIT(S): 5000 INJECTION INTRAVENOUS; SUBCUTANEOUS at 05:52

## 2020-04-01 RX ADMIN — Medication 650 MILLIGRAM(S): at 17:19

## 2020-04-01 NOTE — PROGRESS NOTE ADULT - SUBJECTIVE AND OBJECTIVE BOX
HPI  Patient is a 50y old Male who presents with a chief complaint of HA, fever x5 days (31 Mar 2020 14:36)    Currently admitted to medicine with the primary diagnosis of Viral illness     Today is hospital day 3d.     INTERVAL HPI / OVERNIGHT EVENTS:  Patient was examined and seen at bedside. This morning patient is having increased shortness of breathe and coughing fits. Patient is on NRB 15L. Patient also complains of headaches. He is stating that he is unable to eat or drink and is feeling weak.     ROS: As stated above, Otherwise unremarkable     PAST MEDICAL & SURGICAL HISTORY  Hyperlipidemia  DM (diabetes mellitus)  No significant past surgical history    ALLERGIES  No Known Allergies    MEDICATIONS  STANDING MEDICATIONS  dextrose 5%. 1000 milliLiter(s) IV Continuous <Continuous>  dextrose 50% Injectable 12.5 Gram(s) IV Push once  dextrose 50% Injectable 25 Gram(s) IV Push once  dextrose 50% Injectable 25 Gram(s) IV Push once  heparin  Injectable 5000 Unit(s) SubCutaneous every 8 hours  hydroxychloroquine 200 milliGRAM(s) Oral every 12 hours  hydroxychloroquine   Oral   insulin glargine Injectable (LANTUS) 12 Unit(s) SubCutaneous at bedtime  insulin lispro (HumaLOG) corrective regimen sliding scale   SubCutaneous three times a day before meals  insulin lispro Injectable (HumaLOG) 4 Unit(s) SubCutaneous three times a day with meals  sodium chloride 0.9%. 500 milliLiter(s) IV Continuous <Continuous>    PRN MEDICATIONS  acetaminophen   Tablet .. 650 milliGRAM(s) Oral every 6 hours PRN  dextrose 40% Gel 15 Gram(s) Oral once PRN  glucagon  Injectable 1 milliGRAM(s) IntraMuscular once PRN    VITALS:  T(F): 101.9  HR: 98  BP: 127/73  RR: 16  SpO2: 94%    PHYSICAL EXAM  GEN: uncomfortable, weak.   PULM: increased work of breathing   EXT: No edema  NEURO: A&Ox3, no focal deficits    LABS                        12.2   5.72  )-----------( 131      ( 31 Mar 2020 10:43 )             38.5     03-31    132<L>  |  93<L>  |  14  ----------------------------<  304<H>  4.2   |  25  |  0.8    Ca    8.2<L>      31 Mar 2020 10:43    TPro  6.5  /  Alb  3.7  /  TBili  0.7  /  DBili  x   /  AST  41  /  ALT  42<H>  /  AlkPhos  74  03-31                  RADIOLOGY  < from: Xray Chest 1 View-PORTABLE IMMEDIATE (03.29.20 @ 06:52) >  Impression:      Subtle bilateral reticular peripheral opacities consistent with infectious etiologies in the appropriate clinical setting    < end of copied text >    < from: 12 Lead ECG (03.31.20 @ 08:40) >    Ventricular Rate 96 BPM    Atrial Rate 96 BPM    P-R Interval 166 ms    QRS Duration 100 ms    Q-T Interval 338 ms    QTC Calculation(Bezet) 427 ms    P Axis -9 degrees    R Axis 33 degrees    T Axis 0 degrees    Diagnosis Line Normal sinus rhythm  Poor R wave progression  Confirmed by ANNIE ST, GLENIS ([id number]) on 3/31/2020 12:09:05 PM    < end of copied text >

## 2020-04-01 NOTE — [facility] QUERY NOTE - NSCDIOTHERTXTBX_GEN_ALL_CORE_HH
The diagnosis of- “Sepsis, acute sec to Covid 19” was noted in the ID consult   Your assistance with confirmation of this documented diagnosis is requested.    The following are also documented in the medical record [include the sepsis criteria that apply]:     •	Temperature = max- 102.8  •	WBC = 4.69- 5.72  •	Pulse = 90s- 110s  •	Respirations = 16-18  •	Covid-19 positive  •	Increased O2 needs- PN- 4/1- increased shortness of breath and coughing fits. Patient is on NRB 15L. PULM: increased work of breathing     Treatment: hydroxychloroquine, 2.5 L LR bolus      Based on your medical judgment, can you further clarify in the progress notes if you agree with the consultant on this diagnosis:  •	Viral Sepsis due to Covid 19  •	Severe viral sepsis due to Covid 19  •	Sepsis due to another source- please specify  •	Unable to determine    In responding to this request, please exercise your independent professional judgment.  The fact that a question is asked does not imply that any particular diagnosis is desired or expected.

## 2020-04-01 NOTE — [facility] QUERY NOTE - NSCDIOTHERTXTBX_GEN_ALL_CORE_HH
Documentation in the medical record indicates that this patient has been noted as having Covid-19 and increased work of breathing requiring increased O2 needs (PN- 4/1)  The following is also documented in the medical record:     •	Symptoms:   increased shortness of breath and coughing fits. PULM: increased work of breathing   •	Respiratory Rate:  16-18  •	Per 4/1 NN- “Pt pulse ox 84 on 5 L NC. Switched pt to non rebreather. pts o2 went up to 90%. Contacted md. rechecked spao2 pt now sating at 97-98%. no complaints of sob. will continue to monitor”     Treatment with:  Increased O2 needs- O2 @ NRB 15L    Based on your medical judgment, can you further clarify the diagnosis related to these findings in the progress notes such as:  •	Acute Hypoxic Respiratory Failure  •	Other cause (please specify)  •	Unable to determine      In responding to this request, please exercise your independent professional judgment.  The fact that a question is asked does not imply that any particular diagnosis is desired or expected.    Thank you!

## 2020-04-01 NOTE — PROGRESS NOTE ADULT - ASSESSMENT
51yo M w/ PMH of NIDDM, HLD p/w subjective fever and HA x5 days. Also reporting generalized weakness over same period.  Active COVID-19 infection.     # COVID-19 +  - c/w plaquenil  - repeat qtc 427  - patient remains hypoxic without O2, today on NRB 15L     # Frontotemporal headaches  - check Mg levels   - tylenol prn     # Hyperlipidemia  - pt unsure what medications he takes for HLD, doesn't know pharmacy  - can hold off po agents for now    # DM II  - continue serial FSGs w/ RISS coverage   - improving     # Hyponatremia.  - resolving  - will monitor  - started  ml total 75/hr     # DISPO  Full Code  From home  DVT px    #Progress Note Handoff:  Pending (specify):  resolution of hypoxia  Family discussion: discussed HA, trial mg, covid  Disposition: Home___x/SNF___/Other________/Unknown at this time________ 49yo M w/ PMH of NIDDM, HLD p/w subjective fever and HA x5 days. Also reporting generalized weakness over same period.  Active COVID-19 infection.     # Acute Hypoxic Respiratory Failure and Viral Sepsis 2/2 COVID-19 +  - c/w plaquenil  - repeat qtc 427  - patient remains hypoxic without O2, today on NRB 15L     # Frontotemporal headaches  - check Mg levels   - tylenol prn     # Hyperlipidemia  - pt unsure what medications he takes for HLD, doesn't know pharmacy  - can hold off po agents for now    # DM II  - continue serial FSGs w/ RISS coverage   - improving     # Hyponatremia.  - resolving  - will monitor  - started  ml total 75/hr     # DISPO  Full Code  From home  DVT px    #Progress Note Handoff:  Pending (specify):  resolution of hypoxia  Family discussion: discussed HA, trial mg, covid  Disposition: Home___x/SNF___/Other________/Unknown at this time________ 49yo M w/ PMH of NIDDM, HLD p/w subjective fever and HA x5 days. Also reporting generalized weakness over same period.  Active COVID-19 infection.     # Acute Hypoxic Respiratory Failure and Viral Sepsis 2/2 COVID-19 +  - c/w plaquenil  - repeat qtc 427  - patient remains hypoxic without O2, today on NRB 15L     # Frontotemporal headaches  - check Mg levels   - tylenol prn     # Hyperlipidemia  - pt unsure what medications he takes for HLD, doesn't know pharmacy  - can hold off po agents for now    # DM II  - continue serial FSGs w/ RISS coverage   - improving     # Hyponatremia.  - resolving  - will monitor  - started  ml total 75/hr     # DISPO  Full Code  From home  DVT px

## 2020-04-02 LAB
ALBUMIN SERPL ELPH-MCNC: 3.5 G/DL — SIGNIFICANT CHANGE UP (ref 3.5–5.2)
ALP SERPL-CCNC: 138 U/L — HIGH (ref 30–115)
ALT FLD-CCNC: 110 U/L — HIGH (ref 0–41)
ANION GAP SERPL CALC-SCNC: 19 MMOL/L — HIGH (ref 7–14)
AST SERPL-CCNC: 110 U/L — HIGH (ref 0–41)
BILIRUB SERPL-MCNC: 0.9 MG/DL — SIGNIFICANT CHANGE UP (ref 0.2–1.2)
BUN SERPL-MCNC: 14 MG/DL — SIGNIFICANT CHANGE UP (ref 10–20)
CALCIUM SERPL-MCNC: 8.3 MG/DL — LOW (ref 8.5–10.1)
CHLORIDE SERPL-SCNC: 94 MMOL/L — LOW (ref 98–110)
CO2 SERPL-SCNC: 22 MMOL/L — SIGNIFICANT CHANGE UP (ref 17–32)
CREAT SERPL-MCNC: 0.8 MG/DL — SIGNIFICANT CHANGE UP (ref 0.7–1.5)
CRP SERPL-MCNC: 13.93 MG/DL — HIGH (ref 0–0.4)
CULTURE RESULTS: SIGNIFICANT CHANGE UP
CULTURE RESULTS: SIGNIFICANT CHANGE UP
GLUCOSE BLDC GLUCOMTR-MCNC: 164 MG/DL — HIGH (ref 70–99)
GLUCOSE BLDC GLUCOMTR-MCNC: 209 MG/DL — HIGH (ref 70–99)
GLUCOSE BLDC GLUCOMTR-MCNC: 224 MG/DL — HIGH (ref 70–99)
GLUCOSE BLDC GLUCOMTR-MCNC: 225 MG/DL — HIGH (ref 70–99)
GLUCOSE SERPL-MCNC: 220 MG/DL — HIGH (ref 70–99)
HCT VFR BLD CALC: 37.3 % — LOW (ref 42–52)
HGB BLD-MCNC: 11.8 G/DL — LOW (ref 14–18)
LDH SERPL L TO P-CCNC: 630 U/L — HIGH (ref 50–242)
MAGNESIUM SERPL-MCNC: 2.2 MG/DL — SIGNIFICANT CHANGE UP (ref 1.8–2.4)
MCHC RBC-ENTMCNC: 19.7 PG — LOW (ref 27–31)
MCHC RBC-ENTMCNC: 31.6 G/DL — LOW (ref 32–37)
MCV RBC AUTO: 62.4 FL — LOW (ref 80–94)
NRBC # BLD: 0 /100 WBCS — SIGNIFICANT CHANGE UP (ref 0–0)
PHOSPHATE SERPL-MCNC: 3.7 MG/DL — SIGNIFICANT CHANGE UP (ref 2.1–4.9)
PLATELET # BLD AUTO: 205 K/UL — SIGNIFICANT CHANGE UP (ref 130–400)
POTASSIUM SERPL-MCNC: 4.2 MMOL/L — SIGNIFICANT CHANGE UP (ref 3.5–5)
POTASSIUM SERPL-SCNC: 4.2 MMOL/L — SIGNIFICANT CHANGE UP (ref 3.5–5)
PROT SERPL-MCNC: 6.5 G/DL — SIGNIFICANT CHANGE UP (ref 6–8)
RBC # BLD: 5.98 M/UL — SIGNIFICANT CHANGE UP (ref 4.7–6.1)
RBC # FLD: 13.3 % — SIGNIFICANT CHANGE UP (ref 11.5–14.5)
SODIUM SERPL-SCNC: 135 MMOL/L — SIGNIFICANT CHANGE UP (ref 135–146)
SPECIMEN SOURCE: SIGNIFICANT CHANGE UP
SPECIMEN SOURCE: SIGNIFICANT CHANGE UP
WBC # BLD: 8.92 K/UL — SIGNIFICANT CHANGE UP (ref 4.8–10.8)
WBC # FLD AUTO: 8.92 K/UL — SIGNIFICANT CHANGE UP (ref 4.8–10.8)

## 2020-04-02 PROCEDURE — 99233 SBSQ HOSP IP/OBS HIGH 50: CPT | Mod: GC

## 2020-04-02 RX ORDER — GUAIFENESIN/DEXTROMETHORPHAN 600MG-30MG
10 TABLET, EXTENDED RELEASE 12 HR ORAL EVERY 6 HOURS
Refills: 0 | Status: DISCONTINUED | OUTPATIENT
Start: 2020-04-02 | End: 2020-04-13

## 2020-04-02 RX ADMIN — Medication 200 MILLIGRAM(S): at 10:03

## 2020-04-02 RX ADMIN — Medication 10 MILLILITER(S): at 00:49

## 2020-04-02 RX ADMIN — INSULIN GLARGINE 12 UNIT(S): 100 INJECTION, SOLUTION SUBCUTANEOUS at 21:06

## 2020-04-02 RX ADMIN — Medication 4 UNIT(S): at 17:24

## 2020-04-02 RX ADMIN — Medication 1 MILLIGRAM(S): at 14:04

## 2020-04-02 RX ADMIN — Medication 650 MILLIGRAM(S): at 05:59

## 2020-04-02 RX ADMIN — Medication 650 MILLIGRAM(S): at 21:05

## 2020-04-02 RX ADMIN — Medication 2: at 11:54

## 2020-04-02 RX ADMIN — Medication 2: at 17:25

## 2020-04-02 RX ADMIN — Medication 200 MILLIGRAM(S): at 21:06

## 2020-04-02 RX ADMIN — Medication 4 UNIT(S): at 11:54

## 2020-04-02 NOTE — CHART NOTE - NSCHARTNOTEFT_GEN_A_CORE
Asked to talk to patient because he insists on wearing nasal cannula oxygen  supplementation rather then NRB mask because the mask makes him uncomfortable. Had extensive conversation with him, with RN present, and he is fully awake and oriented breathing very comfortably. He says he feels well but pulse ox on this device remains in the low to mid 80's (Patient jokes that maybe he is "a low oxygen kiah") He doesn't smoke but I believe that possibly pulse ox device is inaccurate (for example low flow to fingers) or maybe his baseline O2 is low. He agrees to be frequently checked and he will call us if he has discomfort Asked to talk to patient because he insists on wearing nasal cannula oxygen  supplementation rather then NRB mask because the mask makes him uncomfortable. Had extensive conversation with him, with RN present, and he is fully awake and oriented breathing very comfortably. He says he feels well but pulse ox on this device remains in the low to mid 80's (Patient jokes that maybe he is "a low oxygen kiah") He doesn't smoke but I believe that possibly pulse ox device is inaccurate (for example low flow to fingers) or maybe his baseline O2 is low. I did tell him that low oxygen levels, if present, could cause damage to organs. He agrees to be frequently checked and he will call us if he has discomfort

## 2020-04-02 NOTE — PROGRESS NOTE ADULT - SUBJECTIVE AND OBJECTIVE BOX
BETSY DAVILA  50y, Male  Allergy: No Known Allergies      CHIEF COMPLAINT: HA, fever x5 days (01 Apr 2020 13:21)      INTERVAL EVENTS/HPI  - No acute events overnight  - T(F): , Max: 100.7 (04-02-20 @ 04:55)  - Denies any worsening symptoms  - Tolerating medication    ROS  10 system review - headache +     SOCIAL HISTORY  Social History:  pt , lives with wife and children. Employed with [employer] in [employer]. Denies any tobacco, EtOH or illicit drug use. (29 Mar 2020 09:41)        FH noncontributory     VITALS:  T(F): 100.7, Max: 100.7 (04-02-20 @ 04:55)  HR: 101  BP: 136/75  RR: 16Vital Signs Last 24 Hrs  T(C): 38.2 (02 Apr 2020 04:55), Max: 38.2 (02 Apr 2020 04:55)  T(F): 100.7 (02 Apr 2020 04:55), Max: 100.7 (02 Apr 2020 04:55)  HR: 101 (02 Apr 2020 04:55) (98 - 102)  BP: 136/75 (02 Apr 2020 04:55) (128/70 - 147/74)  BP(mean): --  RR: 16 (02 Apr 2020 04:55) (16 - 44)  SpO2: 91% (02 Apr 2020 02:31) (90% - 93%)    PHYSICAL EXAM:  Gen: NAD, resting in bed  HEENT: Normocephalic, atraumatic  Neck: supple, no lymphadenopathy  CV: s1 s 2 +   Lungs: decreased BS   Abdomen: Soft, BS present  Ext: Warm, well perfused  Neuro: non focal, awake  Skin: no rash, no erythema      TESTS & MEASUREMENTS:                  Culture - Blood (collected 03-29-20 @ 05:30)  Source: .Blood Blood-Peripheral  Preliminary Report (03-30-20 @ 17:02):    No growth to date.    Culture - Blood (collected 03-29-20 @ 05:30)  Source: .Blood Blood-Peripheral  Preliminary Report (03-30-20 @ 17:02):    No growth to date.    Culture - Urine (collected 03-29-20 @ 05:13)  Source: .Urine Clean Catch (Midstream)  Final Report (03-30-20 @ 12:15):    No growth            INFECTIOUS DISEASES TESTING      RADIOLOGY & ADDITIONAL TESTS:  I have personally reviewed the last Chest xray  CXR - bilat opacity       CARDIOLOGY TESTING  12 Lead ECG:   Ventricular Rate 96 BPM    Atrial Rate 96 BPM    P-R Interval 166 ms    QRS Duration 100 ms    Q-T Interval 338 ms    QTC Calculation(Bezet) 427 ms    P Axis -9 degrees    R Axis 33 degrees    T Axis 0 degrees    Diagnosis Line Normal sinus rhythm  Poor R wave progression  Confirmed by GLENIS VALENCIA MD ([id number]) on 3/31/2020 12:09:05 PM (03-31-20 @ 08:40)  12 Lead ECG:   Ventricular Rate 96 BPM    Atrial Rate 96 BPM    P-R Interval 162 ms    QRS Duration 94 ms    Q-T Interval 330 ms    QTC Calculation(Bezet) 416 ms    P Axis 22 degrees    R Axis 16 degrees    T Axis 12 degrees    Diagnosis Line Normal sinus rhythm  Possible Anteroseptal infarct    Confirmed by GLENIS VALENCIA MD ([id number]) on 3/29/2020 9:18:54 AM (03-29-20 @ 05:29)      MEDICATIONS  dextrose 5%. 1000  dextrose 50% Injectable 12.5  dextrose 50% Injectable 25  dextrose 50% Injectable 25  heparin  Injectable 5000  hydroxychloroquine 200  hydroxychloroquine   insulin glargine Injectable (LANTUS) 12  insulin lispro (HumaLOG) corrective regimen sliding scale   insulin lispro Injectable (HumaLOG) 4  sodium chloride 0.9%. 500      ANTIBIOTICS:  hydroxychloroquine 200 milliGRAM(s) Oral every 12 hours  hydroxychloroquine   Oral

## 2020-04-02 NOTE — PROGRESS NOTE ADULT - SUBJECTIVE AND OBJECTIVE BOX
HPI  Patient is a 50y old Male who presents with a chief complaint of HA, fever x5 days (02 Apr 2020 10:45)    Currently admitted to medicine with the primary diagnosis of Viral illness     Today is hospital day 4d.     INTERVAL HPI / OVERNIGHT EVENTS:  Patient was examined and seen at bedside. Patient reports having difficult night, being unable to sleep or eat because of the mask.   Patient tried removing mask over night, desaturated into low 80s on NC. Now back on NRB mask.   Patient upset over shortness of breathe. States he feels weak.     ROS: Otherwise unremarkable     PAST MEDICAL & SURGICAL HISTORY  Hyperlipidemia  DM (diabetes mellitus)  No significant past surgical history    ALLERGIES  No Known Allergies    MEDICATIONS  STANDING MEDICATIONS  dextrose 5%. 1000 milliLiter(s) IV Continuous <Continuous>  dextrose 50% Injectable 12.5 Gram(s) IV Push once  dextrose 50% Injectable 25 Gram(s) IV Push once  dextrose 50% Injectable 25 Gram(s) IV Push once  heparin  Injectable 5000 Unit(s) SubCutaneous every 8 hours  hydroxychloroquine 200 milliGRAM(s) Oral every 12 hours  hydroxychloroquine   Oral   insulin glargine Injectable (LANTUS) 12 Unit(s) SubCutaneous at bedtime  insulin lispro (HumaLOG) corrective regimen sliding scale   SubCutaneous three times a day before meals  insulin lispro Injectable (HumaLOG) 4 Unit(s) SubCutaneous three times a day with meals  sodium chloride 0.9%. 500 milliLiter(s) IV Continuous <Continuous>    PRN MEDICATIONS  acetaminophen   Tablet .. 650 milliGRAM(s) Oral every 6 hours PRN  dextrose 40% Gel 15 Gram(s) Oral once PRN  glucagon  Injectable 1 milliGRAM(s) IntraMuscular once PRN  guaifenesin/dextromethorphan  Syrup 10 milliLiter(s) Oral every 6 hours PRN    VITALS:  T(F): 100.7  HR: 101  BP: 136/75  RR: 16  SpO2: 91%      LABS                        11.8   8.92  )-----------( 205      ( 02 Apr 2020 09:14 )             37.3     04-02    135  |  94<L>  |  14  ----------------------------<  220<H>  4.2   |  22  |  0.8    Ca    8.3<L>      02 Apr 2020 09:14  Phos  3.7     04-02  Mg     2.2     04-02    TPro  6.5  /  Alb  3.5  /  TBili  0.9  /  DBili  x   /  AST  110<H>  /  ALT  110<H>  /  AlkPhos  138<H>  04-02                  RADIOLOGY

## 2020-04-02 NOTE — PROGRESS NOTE ADULT - ASSESSMENT
49yo M w/ PMH of NIDDM, HLD p/w subjective fever and HA x5 days. Also reporting generalized weakness over same period.  Active COVID-19 infection.     # Acute Hypoxic Respiratory Failure and Viral Sepsis 2/2 COVID-19 +  - c/w plaquenil  - repeat qtc 427  - patient remains hypoxic without O2, today on NRB 15L   - ID following.   - Lactate Dehydrogenase, Serum: 630 U/L      # Frontotemporal headaches  - check Mg levels   - tylenol prn     # Hyperlipidemia  - pt unsure what medications he takes for HLD, doesn't know pharmacy  - can hold off po agents for now    # DM II  - continue serial FSGs w/ RISS coverage   - improving     # Hyponatremia.  - resolving  - will monitor  - started  ml total 75/hr     # DISPO  Full Code  From home  DVT px

## 2020-04-03 LAB
ALBUMIN SERPL ELPH-MCNC: 3.2 G/DL — LOW (ref 3.5–5.2)
ALP SERPL-CCNC: 149 U/L — HIGH (ref 30–115)
ALT FLD-CCNC: 94 U/L — HIGH (ref 0–41)
ANION GAP SERPL CALC-SCNC: 16 MMOL/L — HIGH (ref 7–14)
AST SERPL-CCNC: 76 U/L — HIGH (ref 0–41)
BILIRUB SERPL-MCNC: 1.1 MG/DL — SIGNIFICANT CHANGE UP (ref 0.2–1.2)
BUN SERPL-MCNC: 14 MG/DL — SIGNIFICANT CHANGE UP (ref 10–20)
CALCIUM SERPL-MCNC: 8.1 MG/DL — LOW (ref 8.5–10.1)
CHLORIDE SERPL-SCNC: 97 MMOL/L — LOW (ref 98–110)
CO2 SERPL-SCNC: 25 MMOL/L — SIGNIFICANT CHANGE UP (ref 17–32)
CREAT SERPL-MCNC: 0.7 MG/DL — SIGNIFICANT CHANGE UP (ref 0.7–1.5)
CRP SERPL-MCNC: 13.48 MG/DL — HIGH (ref 0–0.4)
GLUCOSE BLDC GLUCOMTR-MCNC: 153 MG/DL — HIGH (ref 70–99)
GLUCOSE BLDC GLUCOMTR-MCNC: 169 MG/DL — HIGH (ref 70–99)
GLUCOSE BLDC GLUCOMTR-MCNC: 184 MG/DL — HIGH (ref 70–99)
GLUCOSE BLDC GLUCOMTR-MCNC: 206 MG/DL — HIGH (ref 70–99)
GLUCOSE SERPL-MCNC: 232 MG/DL — HIGH (ref 70–99)
HCT VFR BLD CALC: 36.8 % — LOW (ref 42–52)
HGB BLD-MCNC: 11.5 G/DL — LOW (ref 14–18)
MCHC RBC-ENTMCNC: 19.5 PG — LOW (ref 27–31)
MCHC RBC-ENTMCNC: 31.3 G/DL — LOW (ref 32–37)
MCV RBC AUTO: 62.5 FL — LOW (ref 80–94)
NRBC # BLD: 0 /100 WBCS — SIGNIFICANT CHANGE UP (ref 0–0)
PLATELET # BLD AUTO: 280 K/UL — SIGNIFICANT CHANGE UP (ref 130–400)
POTASSIUM SERPL-MCNC: 4.1 MMOL/L — SIGNIFICANT CHANGE UP (ref 3.5–5)
POTASSIUM SERPL-SCNC: 4.1 MMOL/L — SIGNIFICANT CHANGE UP (ref 3.5–5)
PROCALCITONIN SERPL-MCNC: 0.46 NG/ML — HIGH (ref 0.02–0.1)
PROCALCITONIN SERPL-MCNC: 0.68 NG/ML — HIGH (ref 0.02–0.1)
PROT SERPL-MCNC: 6.3 G/DL — SIGNIFICANT CHANGE UP (ref 6–8)
RBC # BLD: 5.89 M/UL — SIGNIFICANT CHANGE UP (ref 4.7–6.1)
RBC # FLD: 13.4 % — SIGNIFICANT CHANGE UP (ref 11.5–14.5)
SODIUM SERPL-SCNC: 138 MMOL/L — SIGNIFICANT CHANGE UP (ref 135–146)
WBC # BLD: 9.97 K/UL — SIGNIFICANT CHANGE UP (ref 4.8–10.8)
WBC # FLD AUTO: 9.97 K/UL — SIGNIFICANT CHANGE UP (ref 4.8–10.8)

## 2020-04-03 PROCEDURE — 99232 SBSQ HOSP IP/OBS MODERATE 35: CPT

## 2020-04-03 RX ADMIN — Medication 4 UNIT(S): at 08:23

## 2020-04-03 RX ADMIN — HEPARIN SODIUM 5000 UNIT(S): 5000 INJECTION INTRAVENOUS; SUBCUTANEOUS at 21:15

## 2020-04-03 RX ADMIN — Medication 4 UNIT(S): at 12:15

## 2020-04-03 RX ADMIN — Medication 1: at 17:04

## 2020-04-03 RX ADMIN — Medication 1: at 12:15

## 2020-04-03 RX ADMIN — Medication 650 MILLIGRAM(S): at 13:27

## 2020-04-03 RX ADMIN — INSULIN GLARGINE 12 UNIT(S): 100 INJECTION, SOLUTION SUBCUTANEOUS at 20:54

## 2020-04-03 RX ADMIN — Medication 650 MILLIGRAM(S): at 06:17

## 2020-04-03 RX ADMIN — Medication 200 MILLIGRAM(S): at 09:55

## 2020-04-03 RX ADMIN — Medication 1: at 08:23

## 2020-04-03 RX ADMIN — Medication 4 UNIT(S): at 17:03

## 2020-04-03 RX ADMIN — HEPARIN SODIUM 5000 UNIT(S): 5000 INJECTION INTRAVENOUS; SUBCUTANEOUS at 13:28

## 2020-04-03 RX ADMIN — Medication 200 MILLIGRAM(S): at 21:15

## 2020-04-03 NOTE — PROGRESS NOTE ADULT - PROBLEM SELECTOR PROBLEM 1
Pneumonia due to 2019 novel coronavirus
Pneumonia due to 2019 novel coronavirus
Viral illness
Viral illness

## 2020-04-03 NOTE — PROGRESS NOTE ADULT - PROBLEM SELECTOR PROBLEM 2
DM (diabetes mellitus)
Sepsis, due to unspecified organism, unspecified whether acute organ dysfunction present
Hyperlipidemia
Hyperlipidemia

## 2020-04-03 NOTE — PROGRESS NOTE ADULT - ASSESSMENT
51yo M w/ PMH of NIDDM, HLD p/w subjective fever and HA x5 days. Also reporting generalized weakness over same period.  Active COVID-19 infection.     # Acute Hypoxic Respiratory Failure and Viral Sepsis 2/2 COVID-19 +  - c/w plaquenil: day 4/5   - repeat qtc 427  - patient remains hypoxic without O2, today on NRB 15L   - appreciate ID following: NOT on pressors - criteria for IV Toci; continue present mx    - Lactate Dehydrogenase, Serum: 630 U/L  - C-Reactive Protein, Serum: 13.93 mg/dL      # Frontotemporal headaches  - Mg 2.2   - tylenol prn       # Hyperlipidemia  - pt unsure what medications he takes for HLD, doesn't know pharmacy  - can hold off po agents for now  - LFT improving     # DM II  - continue serial FSGs w/ RISS coverage   - fairly controlled     # Hyponatremia.  - resolved.   - will monitor  - c/w  ml total 75/hr     # DISPO  Full Code  From home  DVT px

## 2020-04-03 NOTE — PROGRESS NOTE ADULT - PROBLEM SELECTOR PLAN 1
acute covid 19   complete 5 days of HCQ - follow Qt interval on RX    DC planning once less hypoxic   call if any questions
acute viral pneumonia  on HCQ     NOT on pressors - criteria for IV Toci  continue present mx   call for any questions
#r/o COVID-19 infection  - f/u COVID-19 result  - continue symptomatic management, including tylenol and zofran PRN  - continue to monitor RR/O2 sat, provide supplemental O2 as needed
#r/o COVID-19 infection  - f/u COVID-19 result  - continue symptomatic management, including tylenol and zofran PRN  - continue to monitor RR/O2 sat, provide supplemental O2 as needed

## 2020-04-03 NOTE — PROGRESS NOTE ADULT - SUBJECTIVE AND OBJECTIVE BOX
GIOVANNIBETSY  50y, Male  Allergy: No Known Allergies      CHIEF COMPLAINT: HA, fever x5 days (02 Apr 2020 11:43)      INTERVAL EVENTS/HPI  - No acute events overnight  - T(F): , Max: 101.2 (04-03-20 @ 05:34)  - Denies any worsening symptoms  - Tolerating medication    ROS  10 system review - SOB +   SOCIAL HISTORY  Social History:  pt , lives with wife and children. Employed with [employer] in [employer]. Denies any tobacco, EtOH or illicit drug use. (29 Mar 2020 09:41)        FH noncontributory     VITALS:  T(F): 100, Max: 101.2 (04-03-20 @ 05:34)  HR: 101  BP: 147/82  RR: 18Vital Signs Last 24 Hrs  T(C): 37.8 (03 Apr 2020 07:40), Max: 38.4 (03 Apr 2020 05:34)  T(F): 100 (03 Apr 2020 07:40), Max: 101.2 (03 Apr 2020 05:34)  HR: 101 (03 Apr 2020 05:34) (97 - 104)  BP: 147/82 (03 Apr 2020 05:34) (131/68 - 147/82)  BP(mean): --  RR: 18 (03 Apr 2020 05:34) (16 - 22)  SpO2: 92% (03 Apr 2020 07:40) (85% - 95%)    PHYSICAL EXAM:  Gen: Dyspnoea , resting in bed  HEENT: Normocephalic, atraumatic  Neck: supple, no lymphadenopathy  CV: s1 s 2 +   Lungs: decreased BS   Abdomen: Soft, BS present  Ext: Warm, well perfused  Neuro: non focal, awake  Skin: no rash, no erythema      TESTS & MEASUREMENTS:                        11.8   8.92  )-----------( 205      ( 02 Apr 2020 09:14 )             37.3     04-02    135  |  94<L>  |  14  ----------------------------<  220<H>  4.2   |  22  |  0.8    Ca    8.3<L>      02 Apr 2020 09:14  Phos  3.7     04-02  Mg     2.2     04-02    TPro  6.5  /  Alb  3.5  /  TBili  0.9  /  DBili  x   /  AST  110<H>  /  ALT  110<H>  /  AlkPhos  138<H>  04-02    eGFR if Non African American: 103 mL/min/1.73M2 (04-02-20 @ 09:14)  eGFR if : 119 mL/min/1.73M2 (04-02-20 @ 09:14)    LIVER FUNCTIONS - ( 02 Apr 2020 09:14 )  Alb: 3.5 g/dL / Pro: 6.5 g/dL / ALK PHOS: 138 U/L / ALT: 110 U/L / AST: 110 U/L / GGT: x               Culture - Blood (collected 03-29-20 @ 05:30)  Source: .Blood Blood-Peripheral  Final Report (04-02-20 @ 17:00):    No Growth Final    Culture - Blood (collected 03-29-20 @ 05:30)  Source: .Blood Blood-Peripheral  Final Report (04-02-20 @ 17:00):    No Growth Final    Culture - Urine (collected 03-29-20 @ 05:13)  Source: .Urine Clean Catch (Midstream)  Final Report (03-30-20 @ 12:15):    No growth            INFECTIOUS DISEASES TESTING      RADIOLOGY & ADDITIONAL TESTS:  I have personally reviewed the last Chest xray  CXR - bilait opacities       CARDIOLOGY TESTING  12 Lead ECG:   Ventricular Rate 96 BPM    Atrial Rate 96 BPM    P-R Interval 166 ms    QRS Duration 100 ms    Q-T Interval 338 ms    QTC Calculation(Bezet) 427 ms    P Axis -9 degrees    R Axis 33 degrees    T Axis 0 degrees    Diagnosis Line Normal sinus rhythm  Poor R wave progression  Confirmed by GLENIS VALENCIA MD ([id number]) on 3/31/2020 12:09:05 PM (03-31-20 @ 08:40)  12 Lead ECG:   Ventricular Rate 96 BPM    Atrial Rate 96 BPM    P-R Interval 162 ms    QRS Duration 94 ms    Q-T Interval 330 ms    QTC Calculation(Bezet) 416 ms    P Axis 22 degrees    R Axis 16 degrees    T Axis 12 degrees    Diagnosis Line Normal sinus rhythm  Possible Anteroseptal infarct    Confirmed by GLENIS VALENCIA MD ([id number]) on 3/29/2020 9:18:54 AM (03-29-20 @ 05:29)      MEDICATIONS  dextrose 5%. 1000  dextrose 50% Injectable 12.5  dextrose 50% Injectable 25  dextrose 50% Injectable 25  heparin  Injectable 5000  hydroxychloroquine 200  hydroxychloroquine   insulin glargine Injectable (LANTUS) 12  insulin lispro (HumaLOG) corrective regimen sliding scale   insulin lispro Injectable (HumaLOG) 4  sodium chloride 0.9%. 500      ANTIBIOTICS:  hydroxychloroquine 200 milliGRAM(s) Oral every 12 hours  hydroxychloroquine   Oral

## 2020-04-03 NOTE — PROGRESS NOTE ADULT - SUBJECTIVE AND OBJECTIVE BOX
HPI  Patient is a 50y old Male who presents with a chief complaint of HA, fever x5 days (03 Apr 2020 09:00)    Currently admitted to medicine with the primary diagnosis of Viral illness     Today is hospital day 5d.     INTERVAL HPI / OVERNIGHT EVENTS:  Patient was examined and seen at bedside. This morning he is resting comfortably in bed and reports no new issues or overnight events.   Patient remains on NRB, more compliant with mask today. States that he is breathing "a little better" today.   Still upset and would like to go home.   Appetite still poor, patient tolerating liquids.   No other acute complaints.     ROS: Otherwise unremarkable     PAST MEDICAL & SURGICAL HISTORY  Hyperlipidemia  DM (diabetes mellitus)  No significant past surgical history    ALLERGIES  No Known Allergies    MEDICATIONS  STANDING MEDICATIONS  dextrose 5%. 1000 milliLiter(s) IV Continuous <Continuous>  dextrose 50% Injectable 12.5 Gram(s) IV Push once  dextrose 50% Injectable 25 Gram(s) IV Push once  dextrose 50% Injectable 25 Gram(s) IV Push once  heparin  Injectable 5000 Unit(s) SubCutaneous every 8 hours  hydroxychloroquine 200 milliGRAM(s) Oral every 12 hours  hydroxychloroquine   Oral   insulin glargine Injectable (LANTUS) 12 Unit(s) SubCutaneous at bedtime  insulin lispro (HumaLOG) corrective regimen sliding scale   SubCutaneous three times a day before meals  insulin lispro Injectable (HumaLOG) 4 Unit(s) SubCutaneous three times a day with meals  sodium chloride 0.9%. 500 milliLiter(s) IV Continuous <Continuous>    PRN MEDICATIONS  acetaminophen   Tablet .. 650 milliGRAM(s) Oral every 6 hours PRN  dextrose 40% Gel 15 Gram(s) Oral once PRN  glucagon  Injectable 1 milliGRAM(s) IntraMuscular once PRN  guaifenesin/dextromethorphan  Syrup 10 milliLiter(s) Oral every 6 hours PRN  LORazepam   Injectable 0.5 milliGRAM(s) IV Push three times a day PRN    VITALS:  T(F): 100  HR: 101  BP: 147/82  RR: 18  SpO2: 92%        LABS                        11.5   9.97  )-----------( 280      ( 03 Apr 2020 09:34 )             36.8     04-03    138  |  97<L>  |  14  ----------------------------<  232<H>  4.1   |  25  |  0.7    Ca    8.1<L>      03 Apr 2020 09:34  Phos  3.7     04-02  Mg     2.2     04-02    TPro  6.3  /  Alb  3.2<L>  /  TBili  1.1  /  DBili  x   /  AST  76<H>  /  ALT  94<H>  /  AlkPhos  149<H>  04-03                  RADIOLOGY HPI  Patient is a 50y old Male who presents with a chief complaint of HA, fever x5 days (03 Apr 2020 09:00)    Currently admitted to medicine with the primary diagnosis of Viral illness     Today is hospital day 5d.     INTERVAL HPI / OVERNIGHT EVENTS:  Patient was examined and seen at bedside. This morning he is resting comfortably in bed and reports no new issues or overnight events.   Remains febrile.   Patient remains on NRB, more compliant with mask today. States that he is breathing "a little better" today.   Still upset and would like to go home.   Appetite still poor, patient tolerating liquids.   No other acute complaints.     ROS: Otherwise unremarkable     PAST MEDICAL & SURGICAL HISTORY  Hyperlipidemia  DM (diabetes mellitus)  No significant past surgical history    ALLERGIES  No Known Allergies    MEDICATIONS  STANDING MEDICATIONS  dextrose 5%. 1000 milliLiter(s) IV Continuous <Continuous>  dextrose 50% Injectable 12.5 Gram(s) IV Push once  dextrose 50% Injectable 25 Gram(s) IV Push once  dextrose 50% Injectable 25 Gram(s) IV Push once  heparin  Injectable 5000 Unit(s) SubCutaneous every 8 hours  hydroxychloroquine 200 milliGRAM(s) Oral every 12 hours  hydroxychloroquine   Oral   insulin glargine Injectable (LANTUS) 12 Unit(s) SubCutaneous at bedtime  insulin lispro (HumaLOG) corrective regimen sliding scale   SubCutaneous three times a day before meals  insulin lispro Injectable (HumaLOG) 4 Unit(s) SubCutaneous three times a day with meals  sodium chloride 0.9%. 500 milliLiter(s) IV Continuous <Continuous>    PRN MEDICATIONS  acetaminophen   Tablet .. 650 milliGRAM(s) Oral every 6 hours PRN  dextrose 40% Gel 15 Gram(s) Oral once PRN  glucagon  Injectable 1 milliGRAM(s) IntraMuscular once PRN  guaifenesin/dextromethorphan  Syrup 10 milliLiter(s) Oral every 6 hours PRN  LORazepam   Injectable 0.5 milliGRAM(s) IV Push three times a day PRN    VITALS:  T(F): 100  HR: 101  BP: 147/82  RR: 18  SpO2: 92%        LABS                        11.5   9.97  )-----------( 280      ( 03 Apr 2020 09:34 )             36.8     04-03    138  |  97<L>  |  14  ----------------------------<  232<H>  4.1   |  25  |  0.7    Ca    8.1<L>      03 Apr 2020 09:34  Phos  3.7     04-02  Mg     2.2     04-02    TPro  6.3  /  Alb  3.2<L>  /  TBili  1.1  /  DBili  x   /  AST  76<H>  /  ALT  94<H>  /  AlkPhos  149<H>  04-03                  RADIOLOGY

## 2020-04-03 NOTE — PROGRESS NOTE ADULT - PROBLEM SELECTOR PLAN 2
acute   on rx  supportive care
established issue   mx per pmd
- pt unsure what medications he takes for HLD, will attempt to obtain collateral info
- pt unsure what medications he takes for HLD, will attempt to obtain collateral info

## 2020-04-04 LAB
ALBUMIN SERPL ELPH-MCNC: 3.2 G/DL — LOW (ref 3.5–5.2)
ALP SERPL-CCNC: 208 U/L — HIGH (ref 30–115)
ALT FLD-CCNC: 115 U/L — HIGH (ref 0–41)
ANION GAP SERPL CALC-SCNC: 19 MMOL/L — HIGH (ref 7–14)
AST SERPL-CCNC: 77 U/L — HIGH (ref 0–41)
BILIRUB SERPL-MCNC: 1.8 MG/DL — HIGH (ref 0.2–1.2)
BUN SERPL-MCNC: 13 MG/DL — SIGNIFICANT CHANGE UP (ref 10–20)
CALCIUM SERPL-MCNC: 8.3 MG/DL — LOW (ref 8.5–10.1)
CHLORIDE SERPL-SCNC: 92 MMOL/L — LOW (ref 98–110)
CO2 SERPL-SCNC: 24 MMOL/L — SIGNIFICANT CHANGE UP (ref 17–32)
CREAT SERPL-MCNC: 0.8 MG/DL — SIGNIFICANT CHANGE UP (ref 0.7–1.5)
GLUCOSE BLDC GLUCOMTR-MCNC: 141 MG/DL — HIGH (ref 70–99)
GLUCOSE BLDC GLUCOMTR-MCNC: 167 MG/DL — HIGH (ref 70–99)
GLUCOSE BLDC GLUCOMTR-MCNC: 178 MG/DL — HIGH (ref 70–99)
GLUCOSE BLDC GLUCOMTR-MCNC: 195 MG/DL — HIGH (ref 70–99)
GLUCOSE SERPL-MCNC: 186 MG/DL — HIGH (ref 70–99)
HCT VFR BLD CALC: 36.5 % — LOW (ref 42–52)
HGB BLD-MCNC: 11.4 G/DL — LOW (ref 14–18)
MCHC RBC-ENTMCNC: 19.6 PG — LOW (ref 27–31)
MCHC RBC-ENTMCNC: 31.2 G/DL — LOW (ref 32–37)
MCV RBC AUTO: 62.8 FL — LOW (ref 80–94)
NRBC # BLD: 0 /100 WBCS — SIGNIFICANT CHANGE UP (ref 0–0)
PLATELET # BLD AUTO: 336 K/UL — SIGNIFICANT CHANGE UP (ref 130–400)
POTASSIUM SERPL-MCNC: 4.2 MMOL/L — SIGNIFICANT CHANGE UP (ref 3.5–5)
POTASSIUM SERPL-SCNC: 4.2 MMOL/L — SIGNIFICANT CHANGE UP (ref 3.5–5)
PROT SERPL-MCNC: 6.5 G/DL — SIGNIFICANT CHANGE UP (ref 6–8)
RBC # BLD: 5.81 M/UL — SIGNIFICANT CHANGE UP (ref 4.7–6.1)
RBC # FLD: 13.3 % — SIGNIFICANT CHANGE UP (ref 11.5–14.5)
SODIUM SERPL-SCNC: 135 MMOL/L — SIGNIFICANT CHANGE UP (ref 135–146)
WBC # BLD: 11.68 K/UL — HIGH (ref 4.8–10.8)
WBC # FLD AUTO: 11.68 K/UL — HIGH (ref 4.8–10.8)

## 2020-04-04 PROCEDURE — 99232 SBSQ HOSP IP/OBS MODERATE 35: CPT

## 2020-04-04 RX ORDER — ENOXAPARIN SODIUM 100 MG/ML
40 INJECTION SUBCUTANEOUS DAILY
Refills: 0 | Status: DISCONTINUED | OUTPATIENT
Start: 2020-04-05 | End: 2020-04-09

## 2020-04-04 RX ADMIN — INSULIN GLARGINE 12 UNIT(S): 100 INJECTION, SOLUTION SUBCUTANEOUS at 20:50

## 2020-04-04 RX ADMIN — Medication 4 UNIT(S): at 16:54

## 2020-04-04 RX ADMIN — Medication 650 MILLIGRAM(S): at 21:24

## 2020-04-04 RX ADMIN — Medication 200 MILLIGRAM(S): at 20:50

## 2020-04-04 RX ADMIN — Medication 4 UNIT(S): at 08:16

## 2020-04-04 RX ADMIN — Medication 650 MILLIGRAM(S): at 14:54

## 2020-04-04 RX ADMIN — HEPARIN SODIUM 5000 UNIT(S): 5000 INJECTION INTRAVENOUS; SUBCUTANEOUS at 04:51

## 2020-04-04 RX ADMIN — HEPARIN SODIUM 5000 UNIT(S): 5000 INJECTION INTRAVENOUS; SUBCUTANEOUS at 14:54

## 2020-04-04 RX ADMIN — Medication 1: at 08:17

## 2020-04-04 RX ADMIN — Medication 1: at 16:54

## 2020-04-04 RX ADMIN — Medication 650 MILLIGRAM(S): at 05:49

## 2020-04-04 RX ADMIN — Medication 200 MILLIGRAM(S): at 11:25

## 2020-04-04 NOTE — PROGRESS NOTE ADULT - ASSESSMENT
49yo M w/ PMH of NIDDM, HLD p/w subjective fever and HA x5 days. Also reporting generalized weakness over same period.  Active COVID-19 infection.     # Acute Hypoxic Respiratory Failure and Viral Sepsis 2/2 COVID-19 +  - c/w plaquenil: day 5/5   - patient remains hypoxic - currently on ventimask  - appreciate ID input  eleavted  Lactate Dehydrogenase, Serum: 630 U/L, C-Reactive Protein, Serum: 13.93 mg/dL    # Frontotemporal headaches- resolved; possibly related tohypoxia    # Hyperlipidemia  - pt unsure what medications he takes for HLD, doesn't know pharmacy  - can hold off po agents for now  - LFT improving     # DM II  - continue serial FSGs w/ RISS coverage   - fairly controlled     # Hyponatremia.  - resolved.     # DISPO  Full Code  once oxygen requirement comes down plan for discharge to home    DVT px- lovenox

## 2020-04-04 NOTE — PROGRESS NOTE ADULT - SUBJECTIVE AND OBJECTIVE BOX
HPI  Patient is a 50y old Male who presents with a chief complaint of HA, fever x5 days (03 Apr 2020 10:32)    Currently admitted to medicine with the primary diagnosis of Viral illness     Today is hospital day 6d.     INTERVAL HPI / OVERNIGHT EVENTS:  Patient was examined and seen at bedside.   asking when he can go home.  he refused to wear NRB; but saturating low 90's on venti mask.  explained to patietn regarding incertainty of clinical course and he is frustrated on how long he has to stay in the hospital.    ROS: Otherwise unremarkable     PAST MEDICAL & SURGICAL HISTORY  Hyperlipidemia  DM (diabetes mellitus)  No significant past surgical history    ALLERGIES  No Known Allergies    MEDICATIONS  STANDING MEDICATIONS  dextrose 5%. 1000 milliLiter(s) IV Continuous <Continuous>  dextrose 50% Injectable 12.5 Gram(s) IV Push once  dextrose 50% Injectable 25 Gram(s) IV Push once  dextrose 50% Injectable 25 Gram(s) IV Push once  heparin  Injectable 5000 Unit(s) SubCutaneous every 8 hours  hydroxychloroquine 200 milliGRAM(s) Oral every 12 hours  hydroxychloroquine   Oral   insulin glargine Injectable (LANTUS) 12 Unit(s) SubCutaneous at bedtime  insulin lispro (HumaLOG) corrective regimen sliding scale   SubCutaneous three times a day before meals  insulin lispro Injectable (HumaLOG) 4 Unit(s) SubCutaneous three times a day with meals    PRN MEDICATIONS  acetaminophen   Tablet .. 650 milliGRAM(s) Oral every 6 hours PRN  dextrose 40% Gel 15 Gram(s) Oral once PRN  glucagon  Injectable 1 milliGRAM(s) IntraMuscular once PRN  guaifenesin/dextromethorphan  Syrup 10 milliLiter(s) Oral every 6 hours PRN  LORazepam   Injectable 0.5 milliGRAM(s) IV Push three times a day PRN    VITALS:  T(F): 100.4  HR: 104  BP: 137/78  RR: 17  SpO2: 91%    PHYSICAL EXAM  GEN: NAD,not in distress  tachypenic on venti mask  awake and alert; oriented X3    LABS                        11.4   11.68 )-----------( 336      ( 04 Apr 2020 08:30 )             36.5     04-04    135  |  92<L>  |  13  ----------------------------<  186<H>  4.2   |  24  |  0.8    Ca    8.3<L>      04 Apr 2020 08:30    TPro  6.5  /  Alb  3.2<L>  /  TBili  1.8<H>  /  DBili  x   /  AST  77<H>  /  ALT  115<H>  /  AlkPhos  208<H>  04-04                  RADIOLOGY

## 2020-04-05 LAB
GLUCOSE BLDC GLUCOMTR-MCNC: 165 MG/DL — HIGH (ref 70–99)
GLUCOSE BLDC GLUCOMTR-MCNC: 193 MG/DL — HIGH (ref 70–99)
GLUCOSE BLDC GLUCOMTR-MCNC: 213 MG/DL — HIGH (ref 70–99)
GLUCOSE BLDC GLUCOMTR-MCNC: 255 MG/DL — HIGH (ref 70–99)

## 2020-04-05 PROCEDURE — 71045 X-RAY EXAM CHEST 1 VIEW: CPT | Mod: 26

## 2020-04-05 PROCEDURE — 99232 SBSQ HOSP IP/OBS MODERATE 35: CPT

## 2020-04-05 RX ORDER — ACETAMINOPHEN 500 MG
650 TABLET ORAL EVERY 6 HOURS
Refills: 0 | Status: DISCONTINUED | OUTPATIENT
Start: 2020-04-05 | End: 2020-04-13

## 2020-04-05 RX ADMIN — Medication 650 MILLIGRAM(S): at 05:09

## 2020-04-05 RX ADMIN — Medication 3: at 16:47

## 2020-04-05 RX ADMIN — Medication 1: at 08:01

## 2020-04-05 RX ADMIN — Medication 4 UNIT(S): at 16:46

## 2020-04-05 RX ADMIN — Medication 650 MILLIGRAM(S): at 12:00

## 2020-04-05 RX ADMIN — Medication 0.5 MILLIGRAM(S): at 08:32

## 2020-04-05 RX ADMIN — Medication 1: at 11:37

## 2020-04-05 RX ADMIN — Medication 650 MILLIGRAM(S): at 21:35

## 2020-04-05 RX ADMIN — INSULIN GLARGINE 12 UNIT(S): 100 INJECTION, SOLUTION SUBCUTANEOUS at 21:01

## 2020-04-05 RX ADMIN — ENOXAPARIN SODIUM 40 MILLIGRAM(S): 100 INJECTION SUBCUTANEOUS at 11:37

## 2020-04-05 RX ADMIN — Medication 650 MILLIGRAM(S): at 17:13

## 2020-04-05 RX ADMIN — Medication 4 UNIT(S): at 11:37

## 2020-04-05 RX ADMIN — Medication 4 UNIT(S): at 08:00

## 2020-04-05 NOTE — PROGRESS NOTE ADULT - ASSESSMENT
51yo M w/ PMH of NIDDM, HLD p/w subjective fever and HA x5 days. Also reporting generalized weakness over same period.  Active COVID-19 infection.     # Acute Hypoxic Respiratory Failure and Viral Sepsis 2/2 COVID-19 +  - c/w plaquenil: finished 45 day course on 4/4  - patient remains hypoxic - currently on NRB  eleavted  Lactate Dehydrogenase, Serum: 630 U/L, C-Reactive Protein, Serum: 13.93 mg/dL    # Frontotemporal headaches- resolved; possibly related tohypoxia    # Hyperlipidemia  - pt unsure what medications he takes for HLD, doesn't know pharmacy  - can hold off po agents for now  - LFT improving     # DM II  - levemir 12 units daily HS; continue serial FSGs w/ RISS coverage     # Hyponatremia.- resolved.     # DISPO  Full Code  once oxygen requirement comes down plan for discharge to home    DVT px- lovenox  monitor CBC/CMP/LDH?PROCAL/D DIMER/ repeat CXR

## 2020-04-05 NOTE — PROGRESS NOTE ADULT - SUBJECTIVE AND OBJECTIVE BOX
HPI  Patient is a 50y old Male who presents with a chief complaint of HA, fever x5 days (04 Apr 2020 16:17)    Currently admitted to medicine with the primary diagnosis of Viral illness     Today is hospital day 7d.     INTERVAL HPI / OVERNIGHT EVENTS:  feels the same. still have low grade fever  on NRB  states feels okay mostly except feeling SOB occasionally    ROS: Otherwise unremarkable     PAST MEDICAL & SURGICAL HISTORY  Hyperlipidemia  DM (diabetes mellitus)  No significant past surgical history    ALLERGIES  No Known Allergies    MEDICATIONS  STANDING MEDICATIONS  dextrose 5%. 1000 milliLiter(s) IV Continuous <Continuous>  dextrose 50% Injectable 12.5 Gram(s) IV Push once  dextrose 50% Injectable 25 Gram(s) IV Push once  dextrose 50% Injectable 25 Gram(s) IV Push once  enoxaparin Injectable 40 milliGRAM(s) SubCutaneous daily  insulin glargine Injectable (LANTUS) 12 Unit(s) SubCutaneous at bedtime  insulin lispro (HumaLOG) corrective regimen sliding scale   SubCutaneous three times a day before meals  insulin lispro Injectable (HumaLOG) 4 Unit(s) SubCutaneous three times a day with meals    PRN MEDICATIONS  acetaminophen   Tablet .. 650 milliGRAM(s) Oral every 6 hours PRN  dextrose 40% Gel 15 Gram(s) Oral once PRN  glucagon  Injectable 1 milliGRAM(s) IntraMuscular once PRN  guaifenesin/dextromethorphan  Syrup 10 milliLiter(s) Oral every 6 hours PRN  LORazepam   Injectable 0.5 milliGRAM(s) IV Push three times a day PRN    VITALS:  T(F): 99.5  HR: 100  BP: 127/68  RR: 20  SpO2: 89%    PHYSICAL EXAM  GEN: NAD, Resting comfortably in bed  respiratory: no tachypnea; on NRB    LABS                        11.4   11.68 )-----------( 336      ( 04 Apr 2020 08:30 )             36.5     04-04    135  |  92<L>  |  13  ----------------------------<  186<H>  4.2   |  24  |  0.8    Ca    8.3<L>      04 Apr 2020 08:30    TPro  6.5  /  Alb  3.2<L>  /  TBili  1.8<H>  /  DBili  x   /  AST  77<H>  /  ALT  115<H>  /  AlkPhos  208<H>  04-04                  RADIOLOGY

## 2020-04-06 LAB
ALBUMIN SERPL ELPH-MCNC: 2.9 G/DL — LOW (ref 3.5–5.2)
ALP SERPL-CCNC: 162 U/L — HIGH (ref 30–115)
ALT FLD-CCNC: 63 U/L — HIGH (ref 0–41)
ANION GAP SERPL CALC-SCNC: 15 MMOL/L — HIGH (ref 7–14)
ANISOCYTOSIS BLD QL: SIGNIFICANT CHANGE UP
AST SERPL-CCNC: 37 U/L — SIGNIFICANT CHANGE UP (ref 0–41)
BASOPHILS # BLD AUTO: 0 K/UL — SIGNIFICANT CHANGE UP (ref 0–0.2)
BASOPHILS NFR BLD AUTO: 0 % — SIGNIFICANT CHANGE UP (ref 0–1)
BILIRUB SERPL-MCNC: 1 MG/DL — SIGNIFICANT CHANGE UP (ref 0.2–1.2)
BUN SERPL-MCNC: 12 MG/DL — SIGNIFICANT CHANGE UP (ref 10–20)
CALCIUM SERPL-MCNC: 8 MG/DL — LOW (ref 8.5–10.1)
CHLORIDE SERPL-SCNC: 91 MMOL/L — LOW (ref 98–110)
CO2 SERPL-SCNC: 27 MMOL/L — SIGNIFICANT CHANGE UP (ref 17–32)
CREAT SERPL-MCNC: 0.7 MG/DL — SIGNIFICANT CHANGE UP (ref 0.7–1.5)
CRP SERPL-MCNC: 14.33 MG/DL — HIGH (ref 0–0.4)
D DIMER BLD IA.RAPID-MCNC: 2083 NG/ML DDU — HIGH (ref 0–230)
EOSINOPHIL NFR BLD AUTO: 0 % — SIGNIFICANT CHANGE UP (ref 0–8)
GLUCOSE BLDC GLUCOMTR-MCNC: 159 MG/DL — HIGH (ref 70–99)
GLUCOSE BLDC GLUCOMTR-MCNC: 164 MG/DL — HIGH (ref 70–99)
GLUCOSE BLDC GLUCOMTR-MCNC: 198 MG/DL — HIGH (ref 70–99)
GLUCOSE BLDC GLUCOMTR-MCNC: 243 MG/DL — HIGH (ref 70–99)
GLUCOSE SERPL-MCNC: 168 MG/DL — HIGH (ref 70–99)
HCT VFR BLD CALC: 35.1 % — LOW (ref 42–52)
HGB BLD-MCNC: 10.9 G/DL — LOW (ref 14–18)
LDH SERPL L TO P-CCNC: 519 U/L — HIGH (ref 50–242)
LG PLATELETS BLD QL AUTO: SIGNIFICANT CHANGE UP
LYMPHOCYTES # BLD AUTO: 1.27 K/UL — SIGNIFICANT CHANGE UP (ref 1.2–3.4)
LYMPHOCYTES # BLD AUTO: 13 % — LOW (ref 20.5–51.1)
MCHC RBC-ENTMCNC: 19.6 PG — LOW (ref 27–31)
MCHC RBC-ENTMCNC: 31.1 G/DL — LOW (ref 32–37)
MCV RBC AUTO: 63 FL — LOW (ref 80–94)
MONOCYTES # BLD AUTO: 0.2 K/UL — SIGNIFICANT CHANGE UP (ref 0.1–0.6)
MONOCYTES NFR BLD AUTO: 2 % — SIGNIFICANT CHANGE UP (ref 1.7–9.3)
NEUTROPHILS # BLD AUTO: 8.3 K/UL — HIGH (ref 1.4–6.5)
NEUTROPHILS NFR BLD AUTO: 64 % — SIGNIFICANT CHANGE UP (ref 42.2–75.2)
NEUTS BAND # BLD: 21 % — HIGH (ref 0–6)
NRBC # BLD: 0 /100 — SIGNIFICANT CHANGE UP (ref 0–0)
NRBC # BLD: SIGNIFICANT CHANGE UP /100 WBCS (ref 0–0)
PLAT MORPH BLD: ABNORMAL
PLATELET # BLD AUTO: 407 K/UL — HIGH (ref 130–400)
POTASSIUM SERPL-MCNC: 4.1 MMOL/L — SIGNIFICANT CHANGE UP (ref 3.5–5)
POTASSIUM SERPL-SCNC: 4.1 MMOL/L — SIGNIFICANT CHANGE UP (ref 3.5–5)
PROCALCITONIN SERPL-MCNC: 0.35 NG/ML — HIGH (ref 0.02–0.1)
PROT SERPL-MCNC: 6.2 G/DL — SIGNIFICANT CHANGE UP (ref 6–8)
RBC # BLD: 5.57 M/UL — SIGNIFICANT CHANGE UP (ref 4.7–6.1)
RBC # FLD: 13.2 % — SIGNIFICANT CHANGE UP (ref 11.5–14.5)
RBC BLD AUTO: ABNORMAL
SODIUM SERPL-SCNC: 133 MMOL/L — LOW (ref 135–146)
WBC # BLD: 9.76 K/UL — SIGNIFICANT CHANGE UP (ref 4.8–10.8)
WBC # FLD AUTO: 9.76 K/UL — SIGNIFICANT CHANGE UP (ref 4.8–10.8)

## 2020-04-06 PROCEDURE — 99232 SBSQ HOSP IP/OBS MODERATE 35: CPT

## 2020-04-06 RX ORDER — GUAIFENESIN/DEXTROMETHORPHAN 600MG-30MG
5 TABLET, EXTENDED RELEASE 12 HR ORAL EVERY 6 HOURS
Refills: 0 | Status: DISCONTINUED | OUTPATIENT
Start: 2020-04-06 | End: 2020-04-13

## 2020-04-06 RX ADMIN — Medication 4 UNIT(S): at 17:10

## 2020-04-06 RX ADMIN — Medication 650 MILLIGRAM(S): at 17:18

## 2020-04-06 RX ADMIN — INSULIN GLARGINE 12 UNIT(S): 100 INJECTION, SOLUTION SUBCUTANEOUS at 21:43

## 2020-04-06 RX ADMIN — Medication 40 MILLIGRAM(S): at 22:48

## 2020-04-06 RX ADMIN — Medication 650 MILLIGRAM(S): at 01:03

## 2020-04-06 RX ADMIN — Medication 5 MILLILITER(S): at 17:19

## 2020-04-06 RX ADMIN — Medication 4 UNIT(S): at 08:40

## 2020-04-06 RX ADMIN — Medication 2: at 17:10

## 2020-04-06 NOTE — PROGRESS NOTE ADULT - ASSESSMENT
49yo M w/ PMH of NIDDM, HLD p/w subjective fever and HA x5 days. Also reporting generalized weakness over same period.  Active COVID-19 infection.     # Acute Hypoxic Respiratory Failure and Viral Sepsis 2/2 COVID-19 +  - c/w plaquenil: finished 5 day course on 4/4  - patient remains hypoxic - currently on NRB 90% at rest, difficult to wean any lower.  - Lactate Dehydrogenase, Serum: 630 U/L, C-Reactive Protein, Serum: 13.93 mg/dL  - repeat CXR 4/5 shows worsening b/l opacities and development of small b/l pleural effusions    - possible ICU evaluation?     # Frontotemporal headaches  - resolved; possibly related to hypoxia    # Hyperlipidemia  - pt unsure what medications he takes for HLD, doesn't know pharmacy  - can hold off po agents for now  - LFT improving     # DM II  - Levemir 12 units daily HS; continue serial FSGs w/ RISS coverage     # Hyponatremia.  - resolved.     # DISPO  Full Code  once oxygen requirement comes down plan for discharge to home    DVT px- lovenox  monitor CBC/CMP/LDH?PROCAL/D DIMER/ repeat CXR 49yo M w/ PMH of NIDDM, HLD p/w subjective fever and HA x5 days. Also reporting generalized weakness over same period.  Active COVID-19 infection.     # Acute Hypoxic Respiratory Failure and Viral Sepsis 2/2 COVID-19 +  - c/w plaquenil: finished 5 day course on 4/4  - patient remains hypoxic - currently on NRB 90% at rest, difficult to wean any lower.  - Lactate Dehydrogenase, Serum: 630 U/L, C-Reactive Protein, Serum: 13.93 mg/dL  - repeat CXR 4/5 shows worsening b/l opacities and development of small b/l pleural effusions    - discussed with ICU team; recommended continued monitoring in the floor with NRB.     # Frontotemporal headaches  - resolved; possibly related to hypoxia    # Hyperlipidemia  - pt unsure what medications he takes for HLD, doesn't know pharmacy  - can hold off po agents for now  - LFT improving     # DM II  - Levemir 12 units daily HS; continue serial FSGs w/ RISS coverage     # Hyponatremia.  - resolved.     # DISPO  Full Code  once oxygen requirement comes down plan for discharge to home    DVT px- lovenox

## 2020-04-06 NOTE — PROGRESS NOTE ADULT - SUBJECTIVE AND OBJECTIVE BOX
HPI  Patient is a 50y old Male who presents with a chief complaint of HA, fever x5 days (05 Apr 2020 15:51)    Currently admitted to medicine with the primary diagnosis of Viral illness     Today is hospital day 8d.     INTERVAL HPI / OVERNIGHT EVENTS:  Patient was examined and seen at bedside. This morning he is resting comfortably in bed and reports no new issues or overnight events.   He remains upset that he needs to be on NRB. He states he wants to go home.   Patient reeducated on O2 saturation levels, which remain at 90% on NRB and drop significantly when placed on NC.   Patient acknowledges.   No other acute issues.       ROS: Otherwise unremarkable     PAST MEDICAL & SURGICAL HISTORY  Hyperlipidemia  DM (diabetes mellitus)  No significant past surgical history    ALLERGIES  No Known Allergies    MEDICATIONS  STANDING MEDICATIONS  dextrose 5%. 1000 milliLiter(s) IV Continuous <Continuous>  dextrose 50% Injectable 12.5 Gram(s) IV Push once  dextrose 50% Injectable 25 Gram(s) IV Push once  dextrose 50% Injectable 25 Gram(s) IV Push once  enoxaparin Injectable 40 milliGRAM(s) SubCutaneous daily  insulin glargine Injectable (LANTUS) 12 Unit(s) SubCutaneous at bedtime  insulin lispro (HumaLOG) corrective regimen sliding scale   SubCutaneous three times a day before meals  insulin lispro Injectable (HumaLOG) 4 Unit(s) SubCutaneous three times a day with meals    PRN MEDICATIONS  acetaminophen   Tablet .. 650 milliGRAM(s) Oral every 6 hours PRN  acetaminophen   Tablet .. 650 milliGRAM(s) Oral every 6 hours PRN  dextrose 40% Gel 15 Gram(s) Oral once PRN  glucagon  Injectable 1 milliGRAM(s) IntraMuscular once PRN  guaifenesin/dextromethorphan  Syrup 10 milliLiter(s) Oral every 6 hours PRN  LORazepam   Injectable 0.5 milliGRAM(s) IV Push three times a day PRN    VITALS:  T(F): 98.7  HR: 96  BP: 135/82  RR: 16  SpO2: 90%        LABS                        10.9   9.76  )-----------( 407      ( 06 Apr 2020 07:53 )             35.1     04-06    133<L>  |  91<L>  |  12  ----------------------------<  168<H>  4.1   |  27  |  0.7    Ca    8.0<L>      06 Apr 2020 07:53    TPro  6.2  /  Alb  2.9<L>  /  TBili  1.0  /  DBili  x   /  AST  37  /  ALT  63<H>  /  AlkPhos  162<H>  04-06                  RADIOLOGY    < from: Xray Chest 1 View- PORTABLE-Routine (04.05.20 @ 17:55) >    Interval worsening of bilateral patchy opacities predominantly along the mid to lower peripheral lung fields.    Interval development of small volume bilateral pleural effusion    < end of copied text >

## 2020-04-07 LAB
ALBUMIN SERPL ELPH-MCNC: 3.1 G/DL — LOW (ref 3.5–5.2)
ALP SERPL-CCNC: 186 U/L — HIGH (ref 30–115)
ALT FLD-CCNC: 79 U/L — HIGH (ref 0–41)
ANION GAP SERPL CALC-SCNC: 20 MMOL/L — HIGH (ref 7–14)
AST SERPL-CCNC: 56 U/L — HIGH (ref 0–41)
BILIRUB SERPL-MCNC: 0.8 MG/DL — SIGNIFICANT CHANGE UP (ref 0.2–1.2)
BUN SERPL-MCNC: 14 MG/DL — SIGNIFICANT CHANGE UP (ref 10–20)
CALCIUM SERPL-MCNC: 8.4 MG/DL — LOW (ref 8.5–10.1)
CHLORIDE SERPL-SCNC: 92 MMOL/L — LOW (ref 98–110)
CO2 SERPL-SCNC: 24 MMOL/L — SIGNIFICANT CHANGE UP (ref 17–32)
CREAT SERPL-MCNC: 0.7 MG/DL — SIGNIFICANT CHANGE UP (ref 0.7–1.5)
GLUCOSE BLDC GLUCOMTR-MCNC: 256 MG/DL — HIGH (ref 70–99)
GLUCOSE BLDC GLUCOMTR-MCNC: 264 MG/DL — HIGH (ref 70–99)
GLUCOSE BLDC GLUCOMTR-MCNC: 269 MG/DL — HIGH (ref 70–99)
GLUCOSE BLDC GLUCOMTR-MCNC: 271 MG/DL — HIGH (ref 70–99)
GLUCOSE SERPL-MCNC: 308 MG/DL — HIGH (ref 70–99)
MANUAL DIF COMMENT BLD-IMP: SIGNIFICANT CHANGE UP
POTASSIUM SERPL-MCNC: 4.6 MMOL/L — SIGNIFICANT CHANGE UP (ref 3.5–5)
POTASSIUM SERPL-SCNC: 4.6 MMOL/L — SIGNIFICANT CHANGE UP (ref 3.5–5)
PROT SERPL-MCNC: 7.1 G/DL — SIGNIFICANT CHANGE UP (ref 6–8)
SODIUM SERPL-SCNC: 136 MMOL/L — SIGNIFICANT CHANGE UP (ref 135–146)

## 2020-04-07 PROCEDURE — 99232 SBSQ HOSP IP/OBS MODERATE 35: CPT

## 2020-04-07 RX ORDER — INSULIN LISPRO 100/ML
6 VIAL (ML) SUBCUTANEOUS
Refills: 0 | Status: DISCONTINUED | OUTPATIENT
Start: 2020-04-07 | End: 2020-04-13

## 2020-04-07 RX ORDER — INSULIN GLARGINE 100 [IU]/ML
16 INJECTION, SOLUTION SUBCUTANEOUS AT BEDTIME
Refills: 0 | Status: DISCONTINUED | OUTPATIENT
Start: 2020-04-07 | End: 2020-04-13

## 2020-04-07 RX ORDER — PANTOPRAZOLE SODIUM 20 MG/1
40 TABLET, DELAYED RELEASE ORAL
Refills: 0 | Status: DISCONTINUED | OUTPATIENT
Start: 2020-04-07 | End: 2020-04-13

## 2020-04-07 RX ADMIN — Medication 4 UNIT(S): at 12:51

## 2020-04-07 RX ADMIN — Medication 3: at 18:46

## 2020-04-07 RX ADMIN — Medication 5 MILLILITER(S): at 01:19

## 2020-04-07 RX ADMIN — Medication 40 MILLIGRAM(S): at 21:51

## 2020-04-07 RX ADMIN — Medication 3: at 08:48

## 2020-04-07 RX ADMIN — Medication 40 MILLIGRAM(S): at 04:49

## 2020-04-07 RX ADMIN — INSULIN GLARGINE 16 UNIT(S): 100 INJECTION, SOLUTION SUBCUTANEOUS at 21:54

## 2020-04-07 RX ADMIN — Medication 6 UNIT(S): at 18:45

## 2020-04-07 RX ADMIN — ENOXAPARIN SODIUM 40 MILLIGRAM(S): 100 INJECTION SUBCUTANEOUS at 13:09

## 2020-04-07 RX ADMIN — Medication 3: at 12:50

## 2020-04-07 RX ADMIN — Medication 4 UNIT(S): at 08:49

## 2020-04-07 RX ADMIN — PANTOPRAZOLE SODIUM 40 MILLIGRAM(S): 20 TABLET, DELAYED RELEASE ORAL at 21:52

## 2020-04-07 NOTE — PROGRESS NOTE ADULT - SUBJECTIVE AND OBJECTIVE BOX
HPI  Patient is a 50y old Male who presents with a chief complaint of HA, fever x5 days (06 Apr 2020 11:06)    Currently admitted to medicine with the primary diagnosis of Viral illness     Today is hospital day 9d.     INTERVAL HPI / OVERNIGHT EVENTS:  Patient was examined and seen at bedside.  Patient was seen sitting up in hospital recliner with NRB on, appears comfortable.   Patient remains preoccupied on discharge.   Otherwise no acute complaints.         ROS: Otherwise unremarkable     PAST MEDICAL & SURGICAL HISTORY  Hyperlipidemia  DM (diabetes mellitus)  No significant past surgical history    ALLERGIES  No Known Allergies    MEDICATIONS  STANDING MEDICATIONS  dextrose 5%. 1000 milliLiter(s) IV Continuous <Continuous>  dextrose 50% Injectable 12.5 Gram(s) IV Push once  dextrose 50% Injectable 25 Gram(s) IV Push once  dextrose 50% Injectable 25 Gram(s) IV Push once  enoxaparin Injectable 40 milliGRAM(s) SubCutaneous daily  insulin glargine Injectable (LANTUS) 12 Unit(s) SubCutaneous at bedtime  insulin lispro (HumaLOG) corrective regimen sliding scale   SubCutaneous three times a day before meals  insulin lispro Injectable (HumaLOG) 4 Unit(s) SubCutaneous three times a day with meals  methylPREDNISolone sodium succinate Injectable 40 milliGRAM(s) IV Push two times a day    PRN MEDICATIONS  acetaminophen   Tablet .. 650 milliGRAM(s) Oral every 6 hours PRN  acetaminophen   Tablet .. 650 milliGRAM(s) Oral every 6 hours PRN  dextrose 40% Gel 15 Gram(s) Oral once PRN  glucagon  Injectable 1 milliGRAM(s) IntraMuscular once PRN  guaifenesin/dextromethorphan  Syrup 10 milliLiter(s) Oral every 6 hours PRN  guaifenesin/dextromethorphan  Syrup 5 milliLiter(s) Oral every 6 hours PRN  LORazepam   Injectable 0.5 milliGRAM(s) IV Push three times a day PRN    VITALS:  T(F): 98.7  HR: 110  BP: 149/86  RR: 18  SpO2: 93%      LABS                        10.9   9.76  )-----------( 407      ( 06 Apr 2020 07:53 )             35.1     04-07    136  |  92<L>  |  14  ----------------------------<  308<H>  4.6   |  24  |  0.7    Ca    8.4<L>      07 Apr 2020 08:17    TPro  7.1  /  Alb  3.1<L>  /  TBili  0.8  /  DBili  x   /  AST  56<H>  /  ALT  79<H>  /  AlkPhos  186<H>  04-07                  RADIOLOGY

## 2020-04-07 NOTE — PROGRESS NOTE ADULT - ASSESSMENT
49yo M w/ PMH of NIDDM, HLD p/w subjective fever and HA x5 days. Also reporting generalized weakness over same period.  Active COVID-19 infection.     # Acute Hypoxic Respiratory Failure and Viral Sepsis 2/2 COVID-19 +  - c/w plaquenil: finished 5 day course on 4/4  - patient remains hypoxic - currently on NRB 90% at rest, difficult to wean any lower.  - Lactate Dehydrogenase, Serum: 630 U/L, C-Reactive Protein, Serum: 13.93 mg/dL  - repeat CXR 4/5 shows worsening b/l opacities and development of small b/l pleural effusions    - discussed with ICU team; recommended continued monitoring in the floor with NRB.   - patient started on IV solumedrol 40 mg bid on 4/6   - GI ppx      # Frontotemporal headaches  - resolved; possibly related to hypoxia    # Hyperlipidemia  - pt unsure what medications he takes for HLD, doesn't know pharmacy  - can hold off po agents for now  - LFT improving     # DM II  - Levemir 12 units daily HS; continue serial FSGs w/ RISS coverage   - Lispro 4 units tid     # Hyponatremia.  - resolved.     # DISPO  Full Code  once oxygen requirement comes down plan for discharge to home    DVT px- lovenox 49yo M w/ PMH of NIDDM, HLD p/w subjective fever and HA x5 days. Also reporting generalized weakness over same period.  Active COVID-19 infection.     # Acute Hypoxic Respiratory Failure and Viral Sepsis 2/2 COVID-19 +  - c/w plaquenil: finished 5 day course on 4/4  - patient remains hypoxic - currently on NRB 90% at rest, difficult to wean any lower.  - Lactate Dehydrogenase, Serum: 630 U/L, C-Reactive Protein, Serum: 13.93 mg/dL  - repeat CXR 4/5 shows worsening b/l opacities and development of small b/l pleural effusions    - discussed with ICU team; recommended continued monitoring in the floor with NRB.   - patient started on IV solumedrol 40 mg bid on 4/6   - GI ppx      # DM II  - Levemir 12 units daily HS; continue serial FSGs w/ RISS coverage   - Lispro 4 units tid     # Hyperlipidemia  - pt unsure what medications he takes for HLD, doesn't know pharmacy  - can hold off po agents for now  - LFT improving     # Hyponatremia.  - resolved.     # Frontotemporal headaches  - resolved; possibly related to hypoxia    # DISPO  Full Code  once oxygen requirement comes down plan for discharge to home    DVT px- lovenox 49yo M w/ PMH of NIDDM, HLD p/w subjective fever and HA x5 days. Also reporting generalized weakness over same period.  Active COVID-19 infection.     # Acute Hypoxic Respiratory Failure and Viral Sepsis 2/2 COVID-19 +  - c/w plaquenil: finished 5 day course on 4/4  - patient remains hypoxic - currently on NRB 90% at rest, difficult to wean any lower.  - Lactate Dehydrogenase, Serum: 630 U/L, C-Reactive Protein, Serum: 13.93 mg/dL  - repeat CXR 4/5 shows worsening b/l opacities and development of small b/l pleural effusions    - discussed with ICU team; recommended continued monitoring in the floor with NRB.   - patient started on IV solumedrol 40 mg bid on 4/6   - GI ppx      # DM II- uncontrolled; in the setting of steroids  - Levemir increased from 12-16 units; lispro 4-->6 units AC   continue serial FSGs w/ RISS coverage     # Hyperlipidemia  - pt unsure what medications he takes for HLD, doesn't know pharmacy  - can hold off po agents for now  - LFT improving     # Hyponatremia.  - resolved.     # Frontotemporal headaches  - resolved; possibly related to hypoxia    # DISPO  Full Code  once oxygen requirement comes down plan for discharge to home    DVT px- lovenox

## 2020-04-07 NOTE — PROGRESS NOTE ADULT - ATTENDING COMMENTS
Patient seen and examined. Agree with Assessment and Plan of resident.    patient constantly refusing NRB; his oxygenation was dropping to low 80's . Even then patient was refusing NRB  patient counseled multiple times on need for additional oxygen supplements  states the mask is making him cough and that's why he does not want to wear it    patient was informed about the health issues that can rise including respiratory failure, cardiac arrest because of non compliance. he verbalized understanding.    patient was given ativan 1 mg following which he was more cooperative and was wearing mask. will order ativan 0.5 mg PRN q8h
Patient seen and examined. Case discussed with Resident and agree with assessment and Plan
patient seen and examined. Case discussed with resident. Progress note reviewed. Agree with Assessment and Plan    Patient on % FIo2, without improvement. patient is comfortable without increased work of breathing.  Discussed with Intensivist Dr [place] yesterday. Recommended starting trial of Methylprednisolone 40mg IV bID for 5 days.  Monitor sugar levels. Adjust Insulin.
I have personally seen, examined and participated in the care of this patient. I reviewed the chart and the note and agree with assessment and plan of the resident.

## 2020-04-08 LAB
ALBUMIN SERPL ELPH-MCNC: 3.2 G/DL — LOW (ref 3.5–5.2)
ALP SERPL-CCNC: 151 U/L — HIGH (ref 30–115)
ALT FLD-CCNC: 101 U/L — HIGH (ref 0–41)
ANION GAP SERPL CALC-SCNC: 13 MMOL/L — SIGNIFICANT CHANGE UP (ref 7–14)
ANION GAP SERPL CALC-SCNC: 15 MMOL/L — HIGH (ref 7–14)
AST SERPL-CCNC: 60 U/L — HIGH (ref 0–41)
BILIRUB SERPL-MCNC: 0.6 MG/DL — SIGNIFICANT CHANGE UP (ref 0.2–1.2)
BUN SERPL-MCNC: 18 MG/DL — SIGNIFICANT CHANGE UP (ref 10–20)
BUN SERPL-MCNC: 19 MG/DL — SIGNIFICANT CHANGE UP (ref 10–20)
CALCIUM SERPL-MCNC: 8.5 MG/DL — SIGNIFICANT CHANGE UP (ref 8.5–10.1)
CALCIUM SERPL-MCNC: 8.7 MG/DL — SIGNIFICANT CHANGE UP (ref 8.5–10.1)
CHLORIDE SERPL-SCNC: 91 MMOL/L — LOW (ref 98–110)
CHLORIDE SERPL-SCNC: 93 MMOL/L — LOW (ref 98–110)
CO2 SERPL-SCNC: 26 MMOL/L — SIGNIFICANT CHANGE UP (ref 17–32)
CO2 SERPL-SCNC: 28 MMOL/L — SIGNIFICANT CHANGE UP (ref 17–32)
CREAT SERPL-MCNC: 0.6 MG/DL — LOW (ref 0.7–1.5)
CREAT SERPL-MCNC: 0.7 MG/DL — SIGNIFICANT CHANGE UP (ref 0.7–1.5)
GLUCOSE BLDC GLUCOMTR-MCNC: 278 MG/DL — HIGH (ref 70–99)
GLUCOSE BLDC GLUCOMTR-MCNC: 282 MG/DL — HIGH (ref 70–99)
GLUCOSE BLDC GLUCOMTR-MCNC: 305 MG/DL — HIGH (ref 70–99)
GLUCOSE BLDC GLUCOMTR-MCNC: 384 MG/DL — HIGH (ref 70–99)
GLUCOSE SERPL-MCNC: 292 MG/DL — HIGH (ref 70–99)
GLUCOSE SERPL-MCNC: 337 MG/DL — HIGH (ref 70–99)
POTASSIUM SERPL-MCNC: 5.2 MMOL/L — HIGH (ref 3.5–5)
POTASSIUM SERPL-MCNC: 5.3 MMOL/L — HIGH (ref 3.5–5)
POTASSIUM SERPL-SCNC: 5.2 MMOL/L — HIGH (ref 3.5–5)
POTASSIUM SERPL-SCNC: 5.3 MMOL/L — HIGH (ref 3.5–5)
PROT SERPL-MCNC: 6.4 G/DL — SIGNIFICANT CHANGE UP (ref 6–8)
SODIUM SERPL-SCNC: 132 MMOL/L — LOW (ref 135–146)
SODIUM SERPL-SCNC: 134 MMOL/L — LOW (ref 135–146)

## 2020-04-08 PROCEDURE — 99233 SBSQ HOSP IP/OBS HIGH 50: CPT

## 2020-04-08 RX ORDER — SODIUM POLYSTYRENE SULFONATE 4.1 MEQ/G
15 POWDER, FOR SUSPENSION ORAL ONCE
Refills: 0 | Status: COMPLETED | OUTPATIENT
Start: 2020-04-08 | End: 2020-04-08

## 2020-04-08 RX ADMIN — PANTOPRAZOLE SODIUM 40 MILLIGRAM(S): 20 TABLET, DELAYED RELEASE ORAL at 06:23

## 2020-04-08 RX ADMIN — INSULIN GLARGINE 16 UNIT(S): 100 INJECTION, SOLUTION SUBCUTANEOUS at 21:18

## 2020-04-08 RX ADMIN — Medication 3: at 08:10

## 2020-04-08 RX ADMIN — Medication 5: at 12:49

## 2020-04-08 RX ADMIN — Medication 40 MILLIGRAM(S): at 06:23

## 2020-04-08 RX ADMIN — Medication 4: at 17:10

## 2020-04-08 RX ADMIN — Medication 6 UNIT(S): at 12:50

## 2020-04-08 RX ADMIN — Medication 40 MILLIGRAM(S): at 17:10

## 2020-04-08 RX ADMIN — Medication 6 UNIT(S): at 17:10

## 2020-04-08 RX ADMIN — ENOXAPARIN SODIUM 40 MILLIGRAM(S): 100 INJECTION SUBCUTANEOUS at 21:18

## 2020-04-08 RX ADMIN — Medication 6 UNIT(S): at 09:11

## 2020-04-08 RX ADMIN — SODIUM POLYSTYRENE SULFONATE 15 GRAM(S): 4.1 POWDER, FOR SUSPENSION ORAL at 14:25

## 2020-04-08 NOTE — PROGRESS NOTE ADULT - SUBJECTIVE AND OBJECTIVE BOX
HPI  Patient is a 50y old Male who presents with a chief complaint of HA, fever x5 days (07 Apr 2020 10:41)    Currently admitted to medicine with the primary diagnosis of Viral illness     Today is hospital day 10d.     INTERVAL HPI / OVERNIGHT EVENTS:  Patient was examined and seen at bedside.   ROS: Otherwise unremarkable     PAST MEDICAL & SURGICAL HISTORY  Hyperlipidemia  DM (diabetes mellitus)  No significant past surgical history    ALLERGIES  No Known Allergies    MEDICATIONS  STANDING MEDICATIONS  dextrose 5%. 1000 milliLiter(s) IV Continuous <Continuous>  dextrose 50% Injectable 12.5 Gram(s) IV Push once  dextrose 50% Injectable 25 Gram(s) IV Push once  dextrose 50% Injectable 25 Gram(s) IV Push once  enoxaparin Injectable 40 milliGRAM(s) SubCutaneous daily  insulin glargine Injectable (LANTUS) 16 Unit(s) SubCutaneous at bedtime  insulin lispro (HumaLOG) corrective regimen sliding scale   SubCutaneous three times a day before meals  insulin lispro Injectable (HumaLOG) 6 Unit(s) SubCutaneous three times a day with meals  methylPREDNISolone sodium succinate Injectable 40 milliGRAM(s) IV Push two times a day  pantoprazole    Tablet 40 milliGRAM(s) Oral before breakfast  sodium polystyrene sulfonate Suspension 15 Gram(s) Oral once    PRN MEDICATIONS  acetaminophen   Tablet .. 650 milliGRAM(s) Oral every 6 hours PRN  acetaminophen   Tablet .. 650 milliGRAM(s) Oral every 6 hours PRN  dextrose 40% Gel 15 Gram(s) Oral once PRN  glucagon  Injectable 1 milliGRAM(s) IntraMuscular once PRN  guaifenesin/dextromethorphan  Syrup 10 milliLiter(s) Oral every 6 hours PRN  guaifenesin/dextromethorphan  Syrup 5 milliLiter(s) Oral every 6 hours PRN  LORazepam   Injectable 0.5 milliGRAM(s) IV Push three times a day PRN    VITALS:  T(F): 98.2  HR: 92  BP: 137/82  RR: 18  SpO2: 95%        LABS    04-08    134<L>  |  93<L>  |  18  ----------------------------<  292<H>  5.3<H>   |  26  |  0.6<L>    Ca    8.5      08 Apr 2020 07:57    TPro  6.4  /  Alb  3.2<L>  /  TBili  0.6  /  DBili  x   /  AST  60<H>  /  ALT  101<H>  /  AlkPhos  151<H>  04-08                  RADIOLOGY Patient is a 50y old Male who presents with a chief complaint of HA, fever x5 days     Currently admitted to medicine with the primary diagnosis of Viral illness     Today is hospital day 10d.     INTERVAL HPI / OVERNIGHT EVENTS:  Patient was examined and seen at bedside.   ROS: Otherwise unremarkable     PAST MEDICAL & SURGICAL HISTORY  Hyperlipidemia  DM (diabetes mellitus)  No significant past surgical history    ALLERGIES  No Known Allergies    MEDICATIONS  STANDING MEDICATIONS  dextrose 5%. 1000 milliLiter(s) IV Continuous <Continuous>  dextrose 50% Injectable 12.5 Gram(s) IV Push once  dextrose 50% Injectable 25 Gram(s) IV Push once  dextrose 50% Injectable 25 Gram(s) IV Push once  enoxaparin Injectable 40 milliGRAM(s) SubCutaneous daily  insulin glargine Injectable (LANTUS) 16 Unit(s) SubCutaneous at bedtime  insulin lispro (HumaLOG) corrective regimen sliding scale   SubCutaneous three times a day before meals  insulin lispro Injectable (HumaLOG) 6 Unit(s) SubCutaneous three times a day with meals  methylPREDNISolone sodium succinate Injectable 40 milliGRAM(s) IV Push two times a day  pantoprazole    Tablet 40 milliGRAM(s) Oral before breakfast  sodium polystyrene sulfonate Suspension 15 Gram(s) Oral once    PRN MEDICATIONS  acetaminophen   Tablet .. 650 milliGRAM(s) Oral every 6 hours PRN  acetaminophen   Tablet .. 650 milliGRAM(s) Oral every 6 hours PRN  dextrose 40% Gel 15 Gram(s) Oral once PRN  glucagon  Injectable 1 milliGRAM(s) IntraMuscular once PRN  guaifenesin/dextromethorphan  Syrup 10 milliLiter(s) Oral every 6 hours PRN  guaifenesin/dextromethorphan  Syrup 5 milliLiter(s) Oral every 6 hours PRN  LORazepam   Injectable 0.5 milliGRAM(s) IV Push three times a day PRN    VITALS:  T(F): 98.2  HR: 92  BP: 137/82  RR: 18  SpO2: 95%        LABS    04-08    134<L>  |  93<L>  |  18  ----------------------------<  292<H>  5.3<H>   |  26  |  0.6<L>    Ca    8.5      08 Apr 2020 07:57    TPro  6.4  /  Alb  3.2<L>  /  TBili  0.6  /  DBili  x   /  AST  60<H>  /  ALT  101<H>  /  AlkPhos  151<H>  04-08

## 2020-04-08 NOTE — PROGRESS NOTE ADULT - ASSESSMENT
51yo M w/ PMH of NIDDM, HLD p/w subjective fever and HA x5 days. Also reporting generalized weakness over same period.  Active COVID-19 infection.     # Acute Hypoxic Respiratory Failure and Viral Sepsis 2/2 COVID-19 +  - c/w plaquenil: finished 5 day course on 4/4  - patient remains hypoxic - currently on NRB --> attempted to wean to Venti mask today   - Lactate Dehydrogenase, Serum: 630 U/L, C-Reactive Protein, Serum: 13.93 mg/dL  - repeat CXR 4/5 shows worsening b/l opacities and development of small b/l pleural effusions    - discussed with ICU team; recommended continued monitoring in the floor with NRB.   - patient started on IV solumedrol 40 mg bid on 4/6   - GI ppx      # DM II- uncontrolled; in the setting of steroids  - Levemir increased from 12-16 units; lispro 4-->6 units AC  - continue serial FSGs w/ RISS coverage   - monitor FS     # Hyperlipidemia  - pt unsure what medications he takes for HLD, doesn't know pharmacy  - can hold off po agents for now  - LFT improving     # Hyponatremia.  - resolved.     # Frontotemporal headaches  - resolved; possibly related to hypoxia    # DISPO  Full Code  once oxygen requirement comes down plan for discharge to home    DVT px- lovenox 51yo M w/ PMH of NIDDM, HLD p/w subjective fever and HA x5 days. Also reporting generalized weakness over same period.  Active COVID-19 infection.     Acute Hypoxic Respiratory Failure and Viral Sepsis 2/2 COVID-19   - ARF present on admission, persisting  - titrate off NRB to venti mask  - c/w plaquenil: finished 5 day course on 4/4  - Lactate Dehydrogenase, Serum: 630 U/L, C-Reactive Protein, Serum: 13.93 mg/dL  - repeat CXR 4/5 shows worsening b/l opacities and development of small b/l pleural effusions    - discussed with ICU team; recommended continued monitoring in the floor with NRB.   - patient started on IV solumedrol 40 mg bid on 4/6, c/w solumedrol for now  - GI ppx      DM II- uncontrolled; in the setting of steroids  - uncontrolled  - increase insulin dose    Hyperkalemia  - kayexlate  - repeat bmp    Hyperlipidemia  - pt unsure what medications he takes for HLD, doesn't know pharmacy  - can hold off po agents for now  - LFT improving     Hyponatremia.  - resolved.     Frontotemporal headaches  - resolved; possibly related to hypoxia    DVT px  Full Code  From home    #Progress Note Handoff:  Pending (specify):  Consults_________, Tests________, Test Results_______, Other_________  Family discussion: discussed weaning off NRB  Disposition: Home___/SNF___/Other________/Unknown at this time________

## 2020-04-09 LAB
ANION GAP SERPL CALC-SCNC: 13 MMOL/L — SIGNIFICANT CHANGE UP (ref 7–14)
BUN SERPL-MCNC: 18 MG/DL — SIGNIFICANT CHANGE UP (ref 10–20)
CALCIUM SERPL-MCNC: 8.4 MG/DL — LOW (ref 8.5–10.1)
CHLORIDE SERPL-SCNC: 90 MMOL/L — LOW (ref 98–110)
CO2 SERPL-SCNC: 28 MMOL/L — SIGNIFICANT CHANGE UP (ref 17–32)
CREAT SERPL-MCNC: 0.8 MG/DL — SIGNIFICANT CHANGE UP (ref 0.7–1.5)
GLUCOSE BLDC GLUCOMTR-MCNC: 231 MG/DL — HIGH (ref 70–99)
GLUCOSE BLDC GLUCOMTR-MCNC: 285 MG/DL — HIGH (ref 70–99)
GLUCOSE BLDC GLUCOMTR-MCNC: 291 MG/DL — HIGH (ref 70–99)
GLUCOSE BLDC GLUCOMTR-MCNC: 330 MG/DL — HIGH (ref 70–99)
GLUCOSE SERPL-MCNC: 360 MG/DL — HIGH (ref 70–99)
POTASSIUM SERPL-MCNC: 4.6 MMOL/L — SIGNIFICANT CHANGE UP (ref 3.5–5)
POTASSIUM SERPL-SCNC: 4.6 MMOL/L — SIGNIFICANT CHANGE UP (ref 3.5–5)
SODIUM SERPL-SCNC: 131 MMOL/L — LOW (ref 135–146)

## 2020-04-09 PROCEDURE — 99233 SBSQ HOSP IP/OBS HIGH 50: CPT

## 2020-04-09 RX ORDER — ENOXAPARIN SODIUM 100 MG/ML
90 INJECTION SUBCUTANEOUS
Refills: 0 | Status: DISCONTINUED | OUTPATIENT
Start: 2020-04-09 | End: 2020-04-13

## 2020-04-09 RX ADMIN — INSULIN GLARGINE 16 UNIT(S): 100 INJECTION, SOLUTION SUBCUTANEOUS at 21:49

## 2020-04-09 RX ADMIN — Medication 2: at 07:55

## 2020-04-09 RX ADMIN — Medication 6 UNIT(S): at 17:11

## 2020-04-09 RX ADMIN — Medication 40 MILLIGRAM(S): at 17:12

## 2020-04-09 RX ADMIN — Medication 6 UNIT(S): at 11:41

## 2020-04-09 RX ADMIN — Medication 6 UNIT(S): at 07:55

## 2020-04-09 RX ADMIN — ENOXAPARIN SODIUM 40 MILLIGRAM(S): 100 INJECTION SUBCUTANEOUS at 11:40

## 2020-04-09 RX ADMIN — PANTOPRAZOLE SODIUM 40 MILLIGRAM(S): 20 TABLET, DELAYED RELEASE ORAL at 06:27

## 2020-04-09 RX ADMIN — Medication 4: at 11:41

## 2020-04-09 RX ADMIN — Medication 3: at 17:13

## 2020-04-09 RX ADMIN — Medication 40 MILLIGRAM(S): at 06:27

## 2020-04-09 NOTE — PROGRESS NOTE ADULT - ASSESSMENT
49yo M w/ PMH of NIDDM, HLD p/w subjective fever and HA x5 days. Also reporting generalized weakness over same period.  Active COVID-19 infection.     Acute Hypoxic Respiratory Failure and Viral Sepsis 2/2 COVID-19   - ARF present on admission, persisting  - titrate off NRB to venti mask --> now on 50% venti mask saturating at 92%   - c/w plaquenil: finished 5 day course on 4/4  - Lactate Dehydrogenase, Serum: 630 U/L, C-Reactive Protein, Serum: 13.93 mg/dL  - repeat CXR 4/5 shows worsening b/l opacities and development of small b/l pleural effusions    - discussed with ICU team; recommended continued monitoring in the floor with NRB.   - patient started on IV solumedrol 40 mg bid on 4/6, c/w solumedrol for now  - GI ppx      DM II- uncontrolled; in the setting of steroids  - uncontrolled  - increase insulin dose  - 6 lispro/16 lantus   - monitor FS     Hyperkalemia  - kayexlate given   - repeat K+ 4.6     Hyperlipidemia  - pt unsure what medications he takes for HLD, doesn't know pharmacy  - can hold off po agents for now  - LFT improving     Hyponatremia.  - Na+ 131 today   -  ml 75cc/hr     Frontotemporal headaches  - resolved; possibly related to hypoxia    DVT px  Full Code  From home    #Progress Note Handoff:  Pending (specify):  Consults_________, Tests________, Test Results_______, Other_________  Family discussion: discussed weaning off NRB. Spoke with wife today, who agrees with plan.   Disposition: Home___/SNF___/Other________/Unknown at this time________ 51yo M w/ PMH of NIDDM, HLD p/w subjective fever and HA x5 days. Also reporting generalized weakness over same period.  Active COVID-19 infection.     Acute Hypoxic Respiratory Failure and Viral Sepsis 2/2 COVID-19   - ARF present on admission, persisting  - titrate off NRB to venti mask --> now on 50% venti mask saturating at 92%   - c/w plaquenil: finished 5 day course on 4/4  - Lactate Dehydrogenase, Serum: 630 U/L, C-Reactive Protein, Serum: 13.93 mg/dL  - repeat CXR 4/5 shows worsening b/l opacities and development of small b/l pleural effusions    - discussed with ICU team; recommended continued monitoring in the floor with NRB.   - patient started on IV solumedrol 40 mg bid on 4/6, c/w solumedrol for now  - GI ppx    - d-dimer 2083-need to start therapeutic lovneox    DM II- uncontrolled; in the setting of steroids  - uncontrolled  - increase insulin dose  - 6 lispro/16 lantus   - monitor FS     Hyperkalemia  - kayexlate given   - repeat K+ 4.6     Hyperlipidemia  - pt unsure what medications he takes for HLD, doesn't know pharmacy  - can hold off po agents for now  - LFT improving     Hyponatremia.  - Na+ 131 today   -  ml 75cc/hr     Frontotemporal headaches  - resolved; possibly related to hypoxia    DVT px  Full Code  From home    #Progress Note Handoff:  Pending (specify):  Consults_________, Tests________, Test Results_______, Other_________  Family discussion: discussed weaning off NRB. Spoke with wife today, who agrees with plan.   Disposition: Home___/SNF___/Other________/Unknown at this time________ 51yo M w/ PMH of NIDDM, HLD p/w subjective fever and HA x5 days. Also reporting generalized weakness over same period.  Active COVID-19 infection.     Acute Hypoxic Respiratory Failure and Viral Sepsis 2/2 COVID-19   - ARF present on admission, persisting  - titrate off NRB to venti mask --> now on 50% venti mask saturating at 92%   - c/w plaquenil: finished 5 day course on 4/4  - Lactate Dehydrogenase, Serum: 630 U/L, C-Reactive Protein, Serum: 13.93 mg/dL  - repeat CXR 4/5 shows worsening b/l opacities and development of small b/l pleural effusions    - discussed with ICU team; recommended continued monitoring in the floor with NRB.   - patient started on IV solumedrol 40 mg bid on 4/6, c/w solumedrol for now  - GI ppx    - d-dimer 2083-need to start therapeutic lovneox    DM II- uncontrolled; in the setting of steroids  - uncontrolled  - increase insulin dose  - 6 lispro/16 lantus   - monitor FS     Hyperkalemia  - kayexlate given   - repeat K+ 4.6     Hyperlipidemia  - pt unsure what medications he takes for HLD, doesn't know pharmacy  - can hold off po agents for now  - LFT improving     Hyponatremia -- resolved.   - Na+ 131 today --> corrected is 135    Frontotemporal headaches  - resolved; possibly related to hypoxia    DVT px  Full Code  From home    #Progress Note Handoff:  Pending (specify):  Consults_________, Tests________, Test Results_______, Other_________  Family discussion: discussed weaning off NRB. Spoke with wife today, who agrees with plan.   Disposition: Home___/SNF___/Other________/Unknown at this time________

## 2020-04-09 NOTE — PROGRESS NOTE ADULT - SUBJECTIVE AND OBJECTIVE BOX
CHIEF COMPLAINT:    Patient is a 50y old  Male who presents with a chief complaint of HA, fever x5 days (08 Apr 2020 11:44)      INTERVAL HPI/OVERNIGHT EVENTS:    Patient seen and examined at bedside. No acute overnight events occurred.    ROS: All other systems are negative.    Vital Signs:    T(F): 98.2 (04-09-20 @ 05:20), Max: 98.3 (04-08-20 @ 21:10)  HR: 82 (04-09-20 @ 05:20) (82 - 93)  BP: 117/66 (04-09-20 @ 05:20) (117/66 - 132/78)  RR: 18 (04-09-20 @ 05:20) (18 - 18)  SpO2: 92% (04-09-20 @ 08:41) (92% - 95%)  I&O's Summary    Daily     Daily   CAPILLARY BLOOD GLUCOSE      POCT Blood Glucose.: 231 mg/dL (09 Apr 2020 07:32)  POCT Blood Glucose.: 282 mg/dL (08 Apr 2020 20:56)  POCT Blood Glucose.: 305 mg/dL (08 Apr 2020 16:30)  POCT Blood Glucose.: 384 mg/dL (08 Apr 2020 11:21)      PHYSICAL EXAM:    Consultant(s) Notes Reviewed:  [x ] YES  [ ] NO  Care Discussed with Consultants/Other Providers [ x] YES  [ ] NO    LABS:    04-09    131<L>  |  90<L>  |  18  ----------------------------<  x   4.6   |  28  |  0.8    Ca    8.4<L>      09 Apr 2020 09:51    TPro  6.4  /  Alb  3.2<L>  /  TBili  0.6  /  DBili  x   /  AST  60<H>  /  ALT  101<H>  /  AlkPhos  151<H>  04-08              RADIOLOGY & ADDITIONAL TESTS:  Imaging or report Personally Reviewed:  [ ] YES  [ ] NO    EKG reviewed independently    Medications:  Standing  dextrose 5%. 1000 milliLiter(s) IV Continuous <Continuous>  dextrose 50% Injectable 12.5 Gram(s) IV Push once  dextrose 50% Injectable 25 Gram(s) IV Push once  dextrose 50% Injectable 25 Gram(s) IV Push once  enoxaparin Injectable 40 milliGRAM(s) SubCutaneous daily  insulin glargine Injectable (LANTUS) 16 Unit(s) SubCutaneous at bedtime  insulin lispro (HumaLOG) corrective regimen sliding scale   SubCutaneous three times a day before meals  insulin lispro Injectable (HumaLOG) 6 Unit(s) SubCutaneous three times a day with meals  methylPREDNISolone sodium succinate Injectable 40 milliGRAM(s) IV Push two times a day  pantoprazole    Tablet 40 milliGRAM(s) Oral before breakfast    PRN Meds  acetaminophen   Tablet .. 650 milliGRAM(s) Oral every 6 hours PRN  acetaminophen   Tablet .. 650 milliGRAM(s) Oral every 6 hours PRN  dextrose 40% Gel 15 Gram(s) Oral once PRN  glucagon  Injectable 1 milliGRAM(s) IntraMuscular once PRN  guaifenesin/dextromethorphan  Syrup 10 milliLiter(s) Oral every 6 hours PRN  guaifenesin/dextromethorphan  Syrup 5 milliLiter(s) Oral every 6 hours PRN  LORazepam   Injectable 0.5 milliGRAM(s) IV Push three times a day PRN      Case discussed with resident  Care discussed with pt CHIEF COMPLAINT:    Patient is a 50y old  Male who presents with a chief complaint of HA, fever x5 days     INTERVAL HPI/OVERNIGHT EVENTS:    Patient seen and examined at bedside. No acute overnight events occurred.    ROS: Denies SOB. All other systems are negative.    Vital Signs:    T(F): 98.2 (04-09-20 @ 05:20), Max: 98.3 (04-08-20 @ 21:10)  HR: 82 (04-09-20 @ 05:20) (82 - 93)  BP: 117/66 (04-09-20 @ 05:20) (117/66 - 132/78)  RR: 18 (04-09-20 @ 05:20) (18 - 18)  SpO2: 92% (04-09-20 @ 08:41) (92% - 95%)  I&O's Summary    Daily     Daily   CAPILLARY BLOOD GLUCOSE      POCT Blood Glucose.: 231 mg/dL (09 Apr 2020 07:32)  POCT Blood Glucose.: 282 mg/dL (08 Apr 2020 20:56)  POCT Blood Glucose.: 305 mg/dL (08 Apr 2020 16:30)  POCT Blood Glucose.: 384 mg/dL (08 Apr 2020 11:21)      PHYSICAL EXAM:      LABS:    04-09    131<L>  |  90<L>  |  18  ----------------------------<  x   4.6   |  28  |  0.8    Ca    8.4<L>      09 Apr 2020 09:51    TPro  6.4  /  Alb  3.2<L>  /  TBili  0.6  /  DBili  x   /  AST  60<H>  /  ALT  101<H>  /  AlkPhos  151<H>  04-08      RADIOLOGY & ADDITIONAL TESTS:  No new images    Medications:  Standing  dextrose 5%. 1000 milliLiter(s) IV Continuous <Continuous>  dextrose 50% Injectable 12.5 Gram(s) IV Push once  dextrose 50% Injectable 25 Gram(s) IV Push once  dextrose 50% Injectable 25 Gram(s) IV Push once  enoxaparin Injectable 40 milliGRAM(s) SubCutaneous daily  insulin glargine Injectable (LANTUS) 16 Unit(s) SubCutaneous at bedtime  insulin lispro (HumaLOG) corrective regimen sliding scale   SubCutaneous three times a day before meals  insulin lispro Injectable (HumaLOG) 6 Unit(s) SubCutaneous three times a day with meals  methylPREDNISolone sodium succinate Injectable 40 milliGRAM(s) IV Push two times a day  pantoprazole    Tablet 40 milliGRAM(s) Oral before breakfast    PRN Meds  acetaminophen   Tablet .. 650 milliGRAM(s) Oral every 6 hours PRN  acetaminophen   Tablet .. 650 milliGRAM(s) Oral every 6 hours PRN  dextrose 40% Gel 15 Gram(s) Oral once PRN  glucagon  Injectable 1 milliGRAM(s) IntraMuscular once PRN  guaifenesin/dextromethorphan  Syrup 10 milliLiter(s) Oral every 6 hours PRN  guaifenesin/dextromethorphan  Syrup 5 milliLiter(s) Oral every 6 hours PRN  LORazepam   Injectable 0.5 milliGRAM(s) IV Push three times a day PRN      Case discussed with resident  Care discussed with pt

## 2020-04-10 LAB
GLUCOSE BLDC GLUCOMTR-MCNC: 213 MG/DL — HIGH (ref 70–99)
GLUCOSE BLDC GLUCOMTR-MCNC: 242 MG/DL — HIGH (ref 70–99)
GLUCOSE BLDC GLUCOMTR-MCNC: 303 MG/DL — HIGH (ref 70–99)
GLUCOSE BLDC GLUCOMTR-MCNC: 378 MG/DL — HIGH (ref 70–99)

## 2020-04-10 PROCEDURE — 99233 SBSQ HOSP IP/OBS HIGH 50: CPT

## 2020-04-10 RX ADMIN — Medication 40 MILLIGRAM(S): at 05:04

## 2020-04-10 RX ADMIN — ENOXAPARIN SODIUM 90 MILLIGRAM(S): 100 INJECTION SUBCUTANEOUS at 05:06

## 2020-04-10 RX ADMIN — Medication 4: at 16:38

## 2020-04-10 RX ADMIN — Medication 5: at 12:31

## 2020-04-10 RX ADMIN — Medication 6 UNIT(S): at 08:33

## 2020-04-10 RX ADMIN — INSULIN GLARGINE 16 UNIT(S): 100 INJECTION, SOLUTION SUBCUTANEOUS at 21:21

## 2020-04-10 RX ADMIN — Medication 6 UNIT(S): at 16:38

## 2020-04-10 RX ADMIN — Medication 6 UNIT(S): at 12:32

## 2020-04-10 RX ADMIN — ENOXAPARIN SODIUM 90 MILLIGRAM(S): 100 INJECTION SUBCUTANEOUS at 16:38

## 2020-04-10 RX ADMIN — PANTOPRAZOLE SODIUM 40 MILLIGRAM(S): 20 TABLET, DELAYED RELEASE ORAL at 05:04

## 2020-04-10 NOTE — PROGRESS NOTE ADULT - ASSESSMENT
51yo M w/ PMH of NIDDM, HLD p/w subjective fever and HA x5 days. Also reporting generalized weakness over same period.  Active COVID-19 infection.     Acute Hypoxic Respiratory Failure and Viral Sepsis 2/2 COVID-19   - ARF present on admission, persisting  - titrate off NRB to venti mask --> now on 50% venti mask saturating at 92%   - c/w plaquenil: finished 5 day course on 4/4  - Lactate Dehydrogenase, Serum: 630 U/L, C-Reactive Protein, Serum: 13.93 mg/dL  - repeat CXR 4/5 shows worsening b/l opacities and development of small b/l pleural effusions    - discussed with ICU team; recommended continued monitoring in the floor with NRB.   - patient started on IV solumedrol 40 mg bid on 4/6, c/w solumedrol for now  - GI ppx    - d-dimer 2083-c/w therapeutic Lovenox 1mg/kg BID     DM II- uncontrolled; in the setting of steroids  - uncontrolled  - increase insulin dose  - 6 lispro/16 lantus   - monitor FS     Hyperkalemia - resolved   - kayexlate given   - repeat K+ 4.6     Hyperlipidemia  - pt unsure what medications he takes for HLD, doesn't know pharmacy  - can hold off po agents for now  - LFT improving     Hyponatremia -- resolved.   - Na+ 131 today --> corrected is 135    Frontotemporal headaches  - resolved; possibly related to hypoxia    DVT px  Full Code  From home    #Progress Note Handoff:  Pending (specify):  Consults_________, Tests________, Test Results_______, Other_________  Family discussion: discussed weaning off NRB. Spoke with wife today, who agrees with plan.   Disposition: Home___/SNF___/Other________/Unknown at this time________

## 2020-04-10 NOTE — PROGRESS NOTE ADULT - SUBJECTIVE AND OBJECTIVE BOX
CHIEF COMPLAINT:    Patient is a 50y old  Male who presents with a chief complaint of HA, fever x5 days (09 Apr 2020 11:03)      INTERVAL HPI/OVERNIGHT EVENTS:    Patient seen and examined at bedside. No acute overnight events occurred.    ROS: All other systems are negative.    Vital Signs:    T(F): 96.8 (04-10-20 @ 05:40), Max: 97.8 (04-09-20 @ 21:30)  HR: 84 (04-10-20 @ 05:40) (80 - 91)  BP: 136/74 (04-10-20 @ 05:40) (124/70 - 142/94)  RR: 16 (04-10-20 @ 05:40) (16 - 18)  SpO2: 94% (04-10-20 @ 02:30) (94% - 95%)  I&O's Summary    Daily     Daily   CAPILLARY BLOOD GLUCOSE      POCT Blood Glucose.: 242 mg/dL (10 Apr 2020 07:42)  POCT Blood Glucose.: 291 mg/dL (09 Apr 2020 20:45)  POCT Blood Glucose.: 285 mg/dL (09 Apr 2020 16:11)  POCT Blood Glucose.: 330 mg/dL (09 Apr 2020 11:07)      PHYSICAL EXAM:  DEFERRED     Consultant(s) Notes Reviewed:  [x ] YES  [ ] NO  Care Discussed with Consultants/Other Providers [ x] YES  [ ] NO    LABS:    04-09    131<L>  |  90<L>  |  18  ----------------------------<  360<H>  4.6   |  28  |  0.8    Ca    8.4<L>      09 Apr 2020 09:51                RADIOLOGY & ADDITIONAL TESTS:  Imaging or report Personally Reviewed:  [ ] YES  [ ] NO    EKG reviewed independently    Medications:  Standing  dextrose 5%. 1000 milliLiter(s) IV Continuous <Continuous>  dextrose 50% Injectable 12.5 Gram(s) IV Push once  dextrose 50% Injectable 25 Gram(s) IV Push once  dextrose 50% Injectable 25 Gram(s) IV Push once  enoxaparin Injectable 90 milliGRAM(s) SubCutaneous two times a day  insulin glargine Injectable (LANTUS) 16 Unit(s) SubCutaneous at bedtime  insulin lispro (HumaLOG) corrective regimen sliding scale   SubCutaneous three times a day before meals  insulin lispro Injectable (HumaLOG) 6 Unit(s) SubCutaneous three times a day with meals  methylPREDNISolone sodium succinate Injectable 40 milliGRAM(s) IV Push two times a day  pantoprazole    Tablet 40 milliGRAM(s) Oral before breakfast    PRN Meds  acetaminophen   Tablet .. 650 milliGRAM(s) Oral every 6 hours PRN  acetaminophen   Tablet .. 650 milliGRAM(s) Oral every 6 hours PRN  dextrose 40% Gel 15 Gram(s) Oral once PRN  glucagon  Injectable 1 milliGRAM(s) IntraMuscular once PRN  guaifenesin/dextromethorphan  Syrup 10 milliLiter(s) Oral every 6 hours PRN  guaifenesin/dextromethorphan  Syrup 5 milliLiter(s) Oral every 6 hours PRN      Case discussed with resident  Care discussed with pt

## 2020-04-10 NOTE — PROGRESS NOTE ADULT - ASSESSMENT
49yo M w/ PMH of NIDDM, HLD p/w subjective fever and HA x5 days. Also reporting generalized weakness over same period.  Active COVID-19 infection.     Acute Hypoxic Respiratory Failure and Viral Sepsis 2/2 COVID-19   - ARF present on admission, persisting  - titrate off NRB to venti mask --> now on 50% venti mask saturating at 92%   - c/w plaquenil: finished 5 day course on 4/4  - Lactate Dehydrogenase, Serum: 630 U/L, C-Reactive Protein, Serum: 13.93 mg/dL  - repeat CXR 4/5 shows worsening b/l opacities and development of small b/l pleural effusions    - discussed with ICU team; recommended continued monitoring in the floor with NRB.   - patient started on IV solumedrol 40 mg bid on 4/6,- will d/c steroids  - GI ppx    - d-dimer 2083-c/w therapeutic Lovenox 1mg/kg BID     DM II- uncontrolled; in the setting of steroids  - uncontrolled  - increase insulin dose  - 6 lispro/16 lantus   - monitor FS   - steroids d/c today, will monitor FS prior to increasing DM II    Hyperkalemia   - resolved   - kayexlate given   - repeat K+ 4.6     Hyperlipidemia  - pt unsure what medications he takes for HLD, doesn't know pharmacy  - can hold off po agents for now  - LFT improving     Hyponatremia -- resolved.   - Na+ 131 today --> corrected is 135    Frontotemporal headaches  - resolved; possibly related to hypoxia    DVT px  Full Code  From home    #Progress Note Handoff:  Pending (specify):  Consults_________, Tests________, Test Results_______, Other_________  Family discussion: discussed weaning off NRB. Spoke with wife today, who agrees with plan.   Disposition: Home___/SNF___/Other________/Unknown at this time________

## 2020-04-10 NOTE — PROGRESS NOTE ADULT - NSREFPHYEXREFTO_GEN_ALL_CORE
ED Physical Exam
Inpatient Physical Exam

## 2020-04-10 NOTE — PROGRESS NOTE ADULT - SUBJECTIVE AND OBJECTIVE BOX
CHIEF COMPLAINT:    Patient is a 50y old  Male who presents with a chief complaint of HA, fever x5 days    INTERVAL HPI/OVERNIGHT EVENTS:    Patient seen and examined at bedside. No acute overnight events occurred.    ROS: Denies SOB, HA. All other systems are negative.    Vital Signs:    T(F): 96.8 (04-10-20 @ 05:40), Max: 97.8 (04-09-20 @ 21:30)  HR: 84 (04-10-20 @ 05:40) (80 - 91)  BP: 136/74 (04-10-20 @ 05:40) (124/70 - 142/94)  RR: 16 (04-10-20 @ 05:40) (16 - 18)  SpO2: 94% (04-10-20 @ 02:30) (94% - 95%)    POCT Blood Glucose.: 242 mg/dL (10 Apr 2020 07:42)  POCT Blood Glucose.: 291 mg/dL (09 Apr 2020 20:45)  POCT Blood Glucose.: 285 mg/dL (09 Apr 2020 16:11)  POCT Blood Glucose.: 330 mg/dL (09 Apr 2020 11:07)      PHYSICAL EXAM:  GENERAL:  NAD  SKIN: No rashes or lesions  HEENT: Atraumatic. Normocephalic. Anicteric  NECK:  No JVD.   PULMONARY: no accessory muscle use, speaks in full sentecnes  EXTREMITIES:  No edema B/L LE.  NEUROLOGIC:  No motor deficit.  PSYCH: Alert & oriented x 3, normal affect    Consultant(s) Notes Reviewed:  [x ] YES  [ ] NO  Care Discussed with Consultants/Other Providers [ x] YES  [ ] NO    LABS:    04-09    131<L>  |  90<L>  |  18  ----------------------------<  360<H>  4.6   |  28  |  0.8    Ca    8.4<L>      09 Apr 2020 09:51    RADIOLOGY & ADDITIONAL TESTS:  No new images    Medications:  Standing  dextrose 5%. 1000 milliLiter(s) IV Continuous <Continuous>  dextrose 50% Injectable 12.5 Gram(s) IV Push once  dextrose 50% Injectable 25 Gram(s) IV Push once  dextrose 50% Injectable 25 Gram(s) IV Push once  enoxaparin Injectable 90 milliGRAM(s) SubCutaneous two times a day  insulin glargine Injectable (LANTUS) 16 Unit(s) SubCutaneous at bedtime  insulin lispro (HumaLOG) corrective regimen sliding scale   SubCutaneous three times a day before meals  insulin lispro Injectable (HumaLOG) 6 Unit(s) SubCutaneous three times a day with meals  methylPREDNISolone sodium succinate Injectable 40 milliGRAM(s) IV Push two times a day  pantoprazole    Tablet 40 milliGRAM(s) Oral before breakfast    PRN Meds  acetaminophen   Tablet .. 650 milliGRAM(s) Oral every 6 hours PRN  acetaminophen   Tablet .. 650 milliGRAM(s) Oral every 6 hours PRN  dextrose 40% Gel 15 Gram(s) Oral once PRN  glucagon  Injectable 1 milliGRAM(s) IntraMuscular once PRN  guaifenesin/dextromethorphan  Syrup 10 milliLiter(s) Oral every 6 hours PRN  guaifenesin/dextromethorphan  Syrup 5 milliLiter(s) Oral every 6 hours PRN

## 2020-04-11 LAB
GLUCOSE BLDC GLUCOMTR-MCNC: 177 MG/DL — HIGH (ref 70–99)
GLUCOSE BLDC GLUCOMTR-MCNC: 198 MG/DL — HIGH (ref 70–99)
GLUCOSE BLDC GLUCOMTR-MCNC: 233 MG/DL — HIGH (ref 70–99)
GLUCOSE BLDC GLUCOMTR-MCNC: 242 MG/DL — HIGH (ref 70–99)

## 2020-04-11 PROCEDURE — 99232 SBSQ HOSP IP/OBS MODERATE 35: CPT

## 2020-04-11 RX ADMIN — ENOXAPARIN SODIUM 90 MILLIGRAM(S): 100 INJECTION SUBCUTANEOUS at 18:07

## 2020-04-11 RX ADMIN — ENOXAPARIN SODIUM 90 MILLIGRAM(S): 100 INJECTION SUBCUTANEOUS at 06:24

## 2020-04-11 RX ADMIN — Medication 6 UNIT(S): at 08:26

## 2020-04-11 RX ADMIN — Medication 2: at 08:26

## 2020-04-11 RX ADMIN — INSULIN GLARGINE 16 UNIT(S): 100 INJECTION, SOLUTION SUBCUTANEOUS at 21:34

## 2020-04-11 RX ADMIN — Medication 6 UNIT(S): at 16:57

## 2020-04-11 RX ADMIN — Medication 1: at 16:57

## 2020-04-11 RX ADMIN — Medication 6 UNIT(S): at 12:40

## 2020-04-11 RX ADMIN — Medication 1: at 12:41

## 2020-04-11 RX ADMIN — PANTOPRAZOLE SODIUM 40 MILLIGRAM(S): 20 TABLET, DELAYED RELEASE ORAL at 06:24

## 2020-04-11 NOTE — PROGRESS NOTE ADULT - ASSESSMENT
51yo M w/ PMH of NIDDM, HLD p/w subjective fever and HA x5 days. Also reporting generalized weakness over same period.  Active COVID-19 infection.     Acute Hypoxic Respiratory Failure and Viral Sepsis 2/2 COVID-19   - ARF present on admission, persisting  - yesterday 50% venti mask saturating at 92% --> now 95% on 6L NC   - c/w plaquenil: finished 5 day course on 4/4  - Lactate Dehydrogenase, Serum: 630 U/L, C-Reactive Protein, Serum: 13.93 mg/dL  - repeat CXR 4/5 shows worsening b/l opacities and development of small b/l pleural effusions    - discussed with ICU team; recommended continued monitoring in the floor with NRB.   - patient started on IV solumedrol 40 mg bid on 4/6,- will d/c steroids  - GI ppx    - d-dimer 2083-c/w therapeutic Lovenox 1mg/kg BID     DM II- uncontrolled; in the setting of steroids  - uncontrolled  - increase insulin dose  - 6 lispro/16 lantus   - monitor FS   - steroids d/c today, will monitor FS prior to increasing DM II    Hyperkalemia   - resolved   - kayexlate given   - repeat K+ 4.6     Hyperlipidemia  - pt unsure what medications he takes for HLD, doesn't know pharmacy  - can hold off po agents for now  - LFT improving     Hyponatremia -- resolved.     Frontotemporal headaches  - resolved    DVT px  Full Code  From home    #Progress Note Handoff:  Pending (specify):  Clinical improvement  Family discussion: discussed weaning off o2  Disposition: Home__x_/SNF___/Other________/Unknown at this time________

## 2020-04-11 NOTE — PROGRESS NOTE ADULT - SUBJECTIVE AND OBJECTIVE BOX
CHIEF COMPLAINT:    Patient is a 50y old  Male who presents with a chief complaint of HA, fever x5 days     INTERVAL HPI/OVERNIGHT EVENTS:    Patient seen and examined at bedside. No acute overnight events occurred.    ROS: Denies SOB. All other systems are negative.    Vital Signs:    T(F): 97.3 (04-11-20 @ 05:18), Max: 98.5 (04-10-20 @ 20:52)  HR: 83 (04-11-20 @ 05:18) (73 - 84)  BP: 119/71 (04-11-20 @ 05:18) (119/65 - 123/81)  RR: 18 (04-11-20 @ 05:18) (18 - 18)  SpO2: 96% (04-11-20 @ 09:15) (92% - 96%)    POCT Blood Glucose.: 198 mg/dL (11 Apr 2020 11:31)  POCT Blood Glucose.: 233 mg/dL (11 Apr 2020 08:17)  POCT Blood Glucose.: 213 mg/dL (10 Apr 2020 21:06)  POCT Blood Glucose.: 303 mg/dL (10 Apr 2020 16:29)    PHYSICAL EXAM:  deferred    PSYCH: Alert & oriented x 3, normal affect      LABS:    No new labs    RADIOLOGY & ADDITIONAL TESTS:  No new images    Medications:  Standing  dextrose 5%. 1000 milliLiter(s) IV Continuous <Continuous>  dextrose 50% Injectable 12.5 Gram(s) IV Push once  dextrose 50% Injectable 25 Gram(s) IV Push once  dextrose 50% Injectable 25 Gram(s) IV Push once  enoxaparin Injectable 90 milliGRAM(s) SubCutaneous two times a day  insulin glargine Injectable (LANTUS) 16 Unit(s) SubCutaneous at bedtime  insulin lispro (HumaLOG) corrective regimen sliding scale   SubCutaneous three times a day before meals  insulin lispro Injectable (HumaLOG) 6 Unit(s) SubCutaneous three times a day with meals  pantoprazole    Tablet 40 milliGRAM(s) Oral before breakfast    PRN Meds  acetaminophen   Tablet .. 650 milliGRAM(s) Oral every 6 hours PRN  acetaminophen   Tablet .. 650 milliGRAM(s) Oral every 6 hours PRN  dextrose 40% Gel 15 Gram(s) Oral once PRN  glucagon  Injectable 1 milliGRAM(s) IntraMuscular once PRN  guaifenesin/dextromethorphan  Syrup 10 milliLiter(s) Oral every 6 hours PRN  guaifenesin/dextromethorphan  Syrup 5 milliLiter(s) Oral every 6 hours PRN

## 2020-04-12 LAB
GLUCOSE BLDC GLUCOMTR-MCNC: 167 MG/DL — HIGH (ref 70–99)
GLUCOSE BLDC GLUCOMTR-MCNC: 182 MG/DL — HIGH (ref 70–99)
GLUCOSE BLDC GLUCOMTR-MCNC: 199 MG/DL — HIGH (ref 70–99)
GLUCOSE BLDC GLUCOMTR-MCNC: 211 MG/DL — HIGH (ref 70–99)

## 2020-04-12 PROCEDURE — 99232 SBSQ HOSP IP/OBS MODERATE 35: CPT

## 2020-04-12 RX ADMIN — Medication 2: at 12:03

## 2020-04-12 RX ADMIN — ENOXAPARIN SODIUM 90 MILLIGRAM(S): 100 INJECTION SUBCUTANEOUS at 17:53

## 2020-04-12 RX ADMIN — PANTOPRAZOLE SODIUM 40 MILLIGRAM(S): 20 TABLET, DELAYED RELEASE ORAL at 05:55

## 2020-04-12 RX ADMIN — Medication 1: at 08:25

## 2020-04-12 RX ADMIN — Medication 1: at 17:15

## 2020-04-12 RX ADMIN — Medication 6 UNIT(S): at 12:03

## 2020-04-12 RX ADMIN — Medication 6 UNIT(S): at 17:15

## 2020-04-12 RX ADMIN — Medication 6 UNIT(S): at 08:26

## 2020-04-12 RX ADMIN — INSULIN GLARGINE 16 UNIT(S): 100 INJECTION, SOLUTION SUBCUTANEOUS at 23:01

## 2020-04-12 RX ADMIN — ENOXAPARIN SODIUM 90 MILLIGRAM(S): 100 INJECTION SUBCUTANEOUS at 05:55

## 2020-04-12 NOTE — DISCHARGE NOTE PROVIDER - NSDCMRMEDTOKEN_GEN_ALL_CORE_FT
atorvastatin 40 mg oral tablet: 1 tab(s) orally once a day  Invokamet  mg-1000 mg oral tablet, extended release: 2 tab(s) orally once a day (in the morning)  losartan 25 mg oral tablet: 1 tab(s) orally once a day

## 2020-04-12 NOTE — PROGRESS NOTE ADULT - ASSESSMENT
51yo M w/ PMH of NIDDM, HLD p/w subjective fever and HA x5 days. Also reporting generalized weakness over same period.  Active COVID-19 infection.     Acute Hypoxic Respiratory Failure and Viral Sepsis 2/2 COVID-19   - ARF present on admission, persisting  - 93% on 3L N[place], will monitor on RA and see if pt qualifies for home o2.  - c/w plaquenil: finished 5 day course on 4/4  - Lactate Dehydrogenase, Serum: 630 U/L, C-Reactive Protein, Serum: 13.93 mg/dL  - repeat CXR 4/5 shows worsening b/l opacities and development of small b/l pleural effusions    - completed course of steroids  - GI ppx    - d-dimer 2083-c/w therapeutic Lovenox 1mg/kg BID - age less than 60, IMPROVEDD score less than 4 so do not need anticoagulation at home.    DM II- uncontrolled; in the setting of steroids  - uncontrolled  - increase insulin dose  - 6 lispro/16 lantus   - monitor FS   - steroids d/c today, will monitor FS prior to increasing DM II    Hyperkalemia   - resolved   - kayexlate given   - repeat K+ 4.6     Hyperlipidemia  - pt unsure what medications he takes for HLD, doesn't know pharmacy  - can hold off po agents for now  - LFT improving     Hyponatremia -- resolved.     Frontotemporal headaches  - resolved    DVT px  Full Code  From home    #Progress Note Handoff:  Pending (specify):  Clinical improvement  Family discussion: discussed weaning off o2, possible d/c home today  Disposition: Home__x_/SNF___/Other________/Unknown at this time________

## 2020-04-12 NOTE — DISCHARGE NOTE PROVIDER - NSDCCPCAREPLAN_GEN_ALL_CORE_FT
PRINCIPAL DISCHARGE DIAGNOSIS  Diagnosis: Viral illness  Assessment and Plan of Treatment: Due to COVID -19 pneumonia  You were diagnosed with coronavirus. Common signs include fever/ and/or respiratory symptoms such as cough and shortness of breath.  It is spread person from person usually after close contact with an infected person through droplets and contact. You were treated with supportive care as there is no specific treatement for disease caused by COVID 19.  It has been determined that you no longer need hospitalization and can recover while remaining in self quarantine at home. Please follow the prevention steps below until a healthcare provider or local or state health department says you can return to normal activities. Please restrict activities outside your home, except for getting medical care. Do not go to work, school or public areas. Avoid using public transportation, ride-sharing or taxis. Separate yourself from other people in your home. Stay in a specific room and away from other people in your home. Use a separate bathroom if available. Call ahead before visiting your doctor. Please wear a facemask when your are around other people. Please clean your hands often with soap and water especially if touching your face or eating. Avoid sharing personal household items. Clean all "high touch" surfaces everyday. Monitor your symptoms for worsening difficulty breathing. You can discontinue home isolation 14 days after positive COVID-19 test. Please call [phone] to speak to a [facility] Coronavirus specialist.      SECONDARY DISCHARGE DIAGNOSES  Diagnosis: Nausea  Assessment and Plan of Treatment:     Diagnosis: Weakness  Assessment and Plan of Treatment:

## 2020-04-12 NOTE — CHART NOTE - NSCHARTNOTEFT_GEN_A_CORE
Despite IV steroids and bronchodilators patient desaturates.    - Room air pulse ox. at rest:  94%   room air    - Room air pulse ox while ambulatin% room air    - Pulse ox while ambulating on 3 liters n/c  O2 90%    Patient will require home O2 for discharge.  Patient is aware and agreeable to home O2.

## 2020-04-12 NOTE — PROGRESS NOTE ADULT - REASON FOR ADMISSION
HA, fever x5 days

## 2020-04-12 NOTE — PROGRESS NOTE ADULT - SUBJECTIVE AND OBJECTIVE BOX
CHIEF COMPLAINT:    Patient is a 50y old  Male who presents with a chief complaint of HA, fever x5 days     INTERVAL HPI/OVERNIGHT EVENTS:    Patient seen and examined at bedside. No acute overnight events occurred.    ROS: Denies Sob. All other systems are negative.    Vital Signs:    T(F): 97.9 (04-12-20 @ 05:30), Max: 97.9 (04-12-20 @ 05:30)  HR: 89 (04-12-20 @ 05:30) (89 - 94)  BP: 120/72 (04-12-20 @ 05:30) (120/72 - 136/76)  RR: 18 (04-12-20 @ 09:27) (18 - 18)  SpO2: 95% (04-12-20 @ 09:27) (91% - 97%)    POCT Blood Glucose.: 182 mg/dL (12 Apr 2020 08:15)  POCT Blood Glucose.: 242 mg/dL (11 Apr 2020 21:10)  POCT Blood Glucose.: 177 mg/dL (11 Apr 2020 16:49)  POCT Blood Glucose.: 198 mg/dL (11 Apr 2020 11:31)      PHYSICAL EXAM:  Deferred      LABS:    None    RADIOLOGY & ADDITIONAL TESTS:  none    Medications:  Standing  dextrose 5%. 1000 milliLiter(s) IV Continuous <Continuous>  dextrose 50% Injectable 12.5 Gram(s) IV Push once  dextrose 50% Injectable 25 Gram(s) IV Push once  dextrose 50% Injectable 25 Gram(s) IV Push once  enoxaparin Injectable 90 milliGRAM(s) SubCutaneous two times a day  insulin glargine Injectable (LANTUS) 16 Unit(s) SubCutaneous at bedtime  insulin lispro (HumaLOG) corrective regimen sliding scale   SubCutaneous three times a day before meals  insulin lispro Injectable (HumaLOG) 6 Unit(s) SubCutaneous three times a day with meals  pantoprazole    Tablet 40 milliGRAM(s) Oral before breakfast    PRN Meds  acetaminophen   Tablet .. 650 milliGRAM(s) Oral every 6 hours PRN  acetaminophen   Tablet .. 650 milliGRAM(s) Oral every 6 hours PRN  dextrose 40% Gel 15 Gram(s) Oral once PRN  glucagon  Injectable 1 milliGRAM(s) IntraMuscular once PRN  guaifenesin/dextromethorphan  Syrup 10 milliLiter(s) Oral every 6 hours PRN  guaifenesin/dextromethorphan  Syrup 5 milliLiter(s) Oral every 6 hours PRN      Case discussed with resident  Care discussed with pt

## 2020-04-12 NOTE — DISCHARGE NOTE PROVIDER - HOSPITAL COURSE
51 yo M presented with fever, found to have COVID-19 viral pneumonia, developed acute hypoxic respiratoyr failure. was on NRB for several days, completed course of HCQ and steroids. Now stable for d/c home.

## 2020-04-13 VITALS
HEART RATE: 94 BPM | RESPIRATION RATE: 18 BRPM | SYSTOLIC BLOOD PRESSURE: 125 MMHG | TEMPERATURE: 98 F | DIASTOLIC BLOOD PRESSURE: 80 MMHG

## 2020-04-13 LAB
GLUCOSE BLDC GLUCOMTR-MCNC: 160 MG/DL — HIGH (ref 70–99)
GLUCOSE BLDC GLUCOMTR-MCNC: 249 MG/DL — HIGH (ref 70–99)

## 2020-04-13 PROCEDURE — 99239 HOSP IP/OBS DSCHRG MGMT >30: CPT

## 2020-04-13 RX ORDER — LOSARTAN POTASSIUM 100 MG/1
1 TABLET, FILM COATED ORAL
Qty: 0 | Refills: 0 | DISCHARGE

## 2020-04-13 RX ORDER — CANAGLIFLOZIN AND METFORMIN HYDROCHLORIDE 50; 500 MG/1; MG/1
2 TABLET, FILM COATED, EXTENDED RELEASE ORAL
Qty: 0 | Refills: 0 | DISCHARGE

## 2020-04-13 RX ORDER — ATORVASTATIN CALCIUM 80 MG/1
1 TABLET, FILM COATED ORAL
Qty: 0 | Refills: 0 | DISCHARGE

## 2020-04-13 RX ADMIN — Medication 6 UNIT(S): at 09:10

## 2020-04-13 RX ADMIN — PANTOPRAZOLE SODIUM 40 MILLIGRAM(S): 20 TABLET, DELAYED RELEASE ORAL at 05:43

## 2020-04-13 RX ADMIN — Medication 6 UNIT(S): at 11:58

## 2020-04-13 RX ADMIN — ENOXAPARIN SODIUM 90 MILLIGRAM(S): 100 INJECTION SUBCUTANEOUS at 05:43

## 2020-04-13 RX ADMIN — Medication 1: at 09:09

## 2020-04-13 RX ADMIN — Medication 2: at 11:58

## 2020-04-13 NOTE — CHART NOTE - NSCHARTNOTEFT_GEN_A_CORE
Pt seen at bedside. Feels well.       - Room air pulse ox. at rest:  94**%       - Room air pulse ox while ambulatin%    - Pulse ox while ambulating on *94liters n/c  2 O2 **%    o2 to be delievered  Home meds clarified with wife  Medically stable for d/c home    time spent coordinating discharge 36 minutes

## 2020-04-13 NOTE — DISCHARGE NOTE NURSING/CASE MANAGEMENT/SOCIAL WORK - PATIENT PORTAL LINK FT
You can access the FollowMyHealth Patient Portal offered by [facility] by registering at the following website: [email or web address]. By joining [employer]’s FollowMyHealth portal, you will also be able to view your health information using other applications (apps) compatible with our system.

## 2020-04-15 DIAGNOSIS — R50.9 FEVER, UNSPECIFIED: ICD-10-CM

## 2020-04-15 DIAGNOSIS — U07.1 COVID-19: ICD-10-CM

## 2020-04-15 DIAGNOSIS — E87.5 HYPERKALEMIA: ICD-10-CM

## 2020-04-15 DIAGNOSIS — J96.01 ACUTE RESPIRATORY FAILURE WITH HYPOXIA: ICD-10-CM

## 2020-04-15 DIAGNOSIS — E78.5 HYPERLIPIDEMIA, UNSPECIFIED: ICD-10-CM

## 2020-04-15 DIAGNOSIS — E87.1 HYPO-OSMOLALITY AND HYPONATREMIA: ICD-10-CM

## 2020-04-15 DIAGNOSIS — E11.65 TYPE 2 DIABETES MELLITUS WITH HYPERGLYCEMIA: ICD-10-CM

## 2020-04-15 DIAGNOSIS — R11.2 NAUSEA WITH VOMITING, UNSPECIFIED: ICD-10-CM

## 2020-04-15 DIAGNOSIS — A41.89 OTHER SPECIFIED SEPSIS: ICD-10-CM
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
## 2020-04-15 DIAGNOSIS — T38.0X5A ADVERSE EFFECT OF GLUCOCORTICOIDS AND SYNTHETIC ANALOGUES, INITIAL ENCOUNTER: ICD-10-CM

## 2020-04-15 DIAGNOSIS — J12.89 OTHER VIRAL PNEUMONIA: ICD-10-CM

## 2020-04-15 DIAGNOSIS — R51 HEADACHE: ICD-10-CM

## 2022-10-13 NOTE — PATIENT PROFILE ADULT - NSPROIMPLANTSMEDDEV_GEN_A_NUR
Detail Level: Detailed Quality 110: Preventive Care And Screening: Influenza Immunization: Influenza Immunization previously received during influenza season None

## 2023-05-30 NOTE — DISCHARGE NOTE NURSING/CASE MANAGEMENT/SOCIAL WORK - NSDPACMPNY_GEN_ALL_CORE
Admission Reconciliation is Completed  Discharge Reconciliation is Not Complete Traveling alone Admission Reconciliation is Completed  Discharge Reconciliation is Completed

## 2024-04-20 ENCOUNTER — EMERGENCY (EMERGENCY)
Facility: HOSPITAL | Age: 54
LOS: 0 days | Discharge: HOME | End: 2024-04-20
Attending: STUDENT IN AN ORGANIZED HEALTH CARE EDUCATION/TRAINING PROGRAM
Payer: COMMERCIAL

## 2024-04-20 VITALS
HEART RATE: 94 BPM | SYSTOLIC BLOOD PRESSURE: 106 MMHG | DIASTOLIC BLOOD PRESSURE: 53 MMHG | OXYGEN SATURATION: 97 % | RESPIRATION RATE: 18 BRPM

## 2024-04-20 VITALS
SYSTOLIC BLOOD PRESSURE: 140 MMHG | WEIGHT: 179.9 LBS | TEMPERATURE: 98 F | RESPIRATION RATE: 20 BRPM | HEART RATE: 92 BPM | OXYGEN SATURATION: 99 % | DIASTOLIC BLOOD PRESSURE: 71 MMHG

## 2024-04-20 DIAGNOSIS — R51.9 HEADACHE, UNSPECIFIED: ICD-10-CM

## 2024-04-20 DIAGNOSIS — Z20.822 CONTACT WITH AND (SUSPECTED) EXPOSURE TO COVID-19: ICD-10-CM

## 2024-04-20 DIAGNOSIS — R19.7 DIARRHEA, UNSPECIFIED: ICD-10-CM

## 2024-04-20 DIAGNOSIS — R11.2 NAUSEA WITH VOMITING, UNSPECIFIED: ICD-10-CM

## 2024-04-20 DIAGNOSIS — E11.9 TYPE 2 DIABETES MELLITUS WITHOUT COMPLICATIONS: ICD-10-CM

## 2024-04-20 DIAGNOSIS — E78.5 HYPERLIPIDEMIA, UNSPECIFIED: ICD-10-CM

## 2024-04-20 DIAGNOSIS — I10 ESSENTIAL (PRIMARY) HYPERTENSION: ICD-10-CM

## 2024-04-20 LAB
ALBUMIN SERPL ELPH-MCNC: 4.7 G/DL — SIGNIFICANT CHANGE UP (ref 3.5–5.2)
ALP SERPL-CCNC: 64 U/L — SIGNIFICANT CHANGE UP (ref 30–115)
ALT FLD-CCNC: 26 U/L — SIGNIFICANT CHANGE UP (ref 0–41)
ANION GAP SERPL CALC-SCNC: 21 MMOL/L — HIGH (ref 7–14)
ANION GAP SERPL CALC-SCNC: 22 MMOL/L — HIGH (ref 7–14)
APPEARANCE UR: CLEAR — SIGNIFICANT CHANGE UP
AST SERPL-CCNC: 14 U/L — SIGNIFICANT CHANGE UP (ref 0–41)
B-OH-BUTYR SERPL-SCNC: 4.4 MMOL/L — HIGH
B-OH-BUTYR SERPL-SCNC: >4.5 MMOL/L — HIGH
BASE EXCESS BLDV CALC-SCNC: -3.9 MMOL/L — LOW (ref -2–3)
BASOPHILS # BLD AUTO: 0.03 K/UL — SIGNIFICANT CHANGE UP (ref 0–0.2)
BASOPHILS NFR BLD AUTO: 0.4 % — SIGNIFICANT CHANGE UP (ref 0–1)
BILIRUB SERPL-MCNC: 0.9 MG/DL — SIGNIFICANT CHANGE UP (ref 0.2–1.2)
BILIRUB UR-MCNC: NEGATIVE — SIGNIFICANT CHANGE UP
BUN SERPL-MCNC: 16 MG/DL — SIGNIFICANT CHANGE UP (ref 10–20)
BUN SERPL-MCNC: 17 MG/DL — SIGNIFICANT CHANGE UP (ref 10–20)
CA-I SERPL-SCNC: 1.2 MMOL/L — SIGNIFICANT CHANGE UP (ref 1.15–1.33)
CALCIUM SERPL-MCNC: 8.4 MG/DL — SIGNIFICANT CHANGE UP (ref 8.4–10.5)
CALCIUM SERPL-MCNC: 9.5 MG/DL — SIGNIFICANT CHANGE UP (ref 8.4–10.5)
CHLORIDE SERPL-SCNC: 93 MMOL/L — LOW (ref 98–110)
CHLORIDE SERPL-SCNC: 99 MMOL/L — SIGNIFICANT CHANGE UP (ref 98–110)
CO2 SERPL-SCNC: 16 MMOL/L — LOW (ref 17–32)
CO2 SERPL-SCNC: 20 MMOL/L — SIGNIFICANT CHANGE UP (ref 17–32)
COLOR SPEC: YELLOW — SIGNIFICANT CHANGE UP
CREAT SERPL-MCNC: 0.8 MG/DL — SIGNIFICANT CHANGE UP (ref 0.7–1.5)
CREAT SERPL-MCNC: 0.8 MG/DL — SIGNIFICANT CHANGE UP (ref 0.7–1.5)
DIFF PNL FLD: NEGATIVE — SIGNIFICANT CHANGE UP
EGFR: 104 ML/MIN/1.73M2 — SIGNIFICANT CHANGE UP
EGFR: 104 ML/MIN/1.73M2 — SIGNIFICANT CHANGE UP
EOSINOPHIL # BLD AUTO: 0 K/UL — SIGNIFICANT CHANGE UP (ref 0–0.7)
EOSINOPHIL NFR BLD AUTO: 0 % — SIGNIFICANT CHANGE UP (ref 0–8)
FLUAV AG NPH QL: SIGNIFICANT CHANGE UP
FLUBV AG NPH QL: SIGNIFICANT CHANGE UP
GAS PNL BLDV: 131 MMOL/L — LOW (ref 136–145)
GAS PNL BLDV: SIGNIFICANT CHANGE UP
GLUCOSE SERPL-MCNC: 135 MG/DL — HIGH (ref 70–99)
GLUCOSE SERPL-MCNC: 147 MG/DL — HIGH (ref 70–99)
GLUCOSE UR QL: >=1000 MG/DL
HCO3 BLDV-SCNC: 22 MMOL/L — SIGNIFICANT CHANGE UP (ref 22–29)
HCT VFR BLD CALC: 45.1 % — SIGNIFICANT CHANGE UP (ref 42–52)
HCT VFR BLDA CALC: 43 % — SIGNIFICANT CHANGE UP (ref 39–51)
HGB BLD CALC-MCNC: 14.3 G/DL — SIGNIFICANT CHANGE UP (ref 12.6–17.4)
HGB BLD-MCNC: 13.8 G/DL — LOW (ref 14–18)
IMM GRANULOCYTES NFR BLD AUTO: 0.4 % — HIGH (ref 0.1–0.3)
KETONES UR-MCNC: >=160 MG/DL
LACTATE BLDV-MCNC: 3.2 MMOL/L — HIGH (ref 0.5–2)
LEUKOCYTE ESTERASE UR-ACNC: NEGATIVE — SIGNIFICANT CHANGE UP
LIDOCAIN IGE QN: 29 U/L — SIGNIFICANT CHANGE UP (ref 7–60)
LYMPHOCYTES # BLD AUTO: 0.97 K/UL — LOW (ref 1.2–3.4)
LYMPHOCYTES # BLD AUTO: 11.6 % — LOW (ref 20.5–51.1)
MAGNESIUM SERPL-MCNC: 2.2 MG/DL — SIGNIFICANT CHANGE UP (ref 1.8–2.4)
MCHC RBC-ENTMCNC: 19.6 PG — LOW (ref 27–31)
MCHC RBC-ENTMCNC: 30.6 G/DL — LOW (ref 32–37)
MCV RBC AUTO: 64.1 FL — LOW (ref 80–94)
MONOCYTES # BLD AUTO: 0.12 K/UL — SIGNIFICANT CHANGE UP (ref 0.1–0.6)
MONOCYTES NFR BLD AUTO: 1.4 % — LOW (ref 1.7–9.3)
NEUTROPHILS # BLD AUTO: 7.18 K/UL — HIGH (ref 1.4–6.5)
NEUTROPHILS NFR BLD AUTO: 86.2 % — HIGH (ref 42.2–75.2)
NITRITE UR-MCNC: NEGATIVE — SIGNIFICANT CHANGE UP
NRBC # BLD: 0 /100 WBCS — SIGNIFICANT CHANGE UP (ref 0–0)
PCO2 BLDV: 40 MMHG — LOW (ref 42–55)
PH BLDV: 7.34 — SIGNIFICANT CHANGE UP (ref 7.32–7.43)
PH UR: 5 — SIGNIFICANT CHANGE UP (ref 5–8)
PLATELET # BLD AUTO: 215 K/UL — SIGNIFICANT CHANGE UP (ref 130–400)
PMV BLD: 9.9 FL — SIGNIFICANT CHANGE UP (ref 7.4–10.4)
PO2 BLDV: 49 MMHG — HIGH (ref 25–45)
POTASSIUM BLDV-SCNC: 4.3 MMOL/L — SIGNIFICANT CHANGE UP (ref 3.5–5.1)
POTASSIUM SERPL-MCNC: 4 MMOL/L — SIGNIFICANT CHANGE UP (ref 3.5–5)
POTASSIUM SERPL-MCNC: 4.2 MMOL/L — SIGNIFICANT CHANGE UP (ref 3.5–5)
POTASSIUM SERPL-SCNC: 4 MMOL/L — SIGNIFICANT CHANGE UP (ref 3.5–5)
POTASSIUM SERPL-SCNC: 4.2 MMOL/L — SIGNIFICANT CHANGE UP (ref 3.5–5)
PROT SERPL-MCNC: 7.3 G/DL — SIGNIFICANT CHANGE UP (ref 6–8)
PROT UR-MCNC: NEGATIVE MG/DL — SIGNIFICANT CHANGE UP
RBC # BLD: 7.04 M/UL — HIGH (ref 4.7–6.1)
RBC # FLD: 15.3 % — HIGH (ref 11.5–14.5)
RSV RNA NPH QL NAA+NON-PROBE: SIGNIFICANT CHANGE UP
SAO2 % BLDV: 81.5 % — SIGNIFICANT CHANGE UP (ref 67–88)
SARS-COV-2 RNA SPEC QL NAA+PROBE: SIGNIFICANT CHANGE UP
SODIUM SERPL-SCNC: 134 MMOL/L — LOW (ref 135–146)
SODIUM SERPL-SCNC: 137 MMOL/L — SIGNIFICANT CHANGE UP (ref 135–146)
SP GR SPEC: >1.03 — HIGH (ref 1–1.03)
UROBILINOGEN FLD QL: 0.2 MG/DL — SIGNIFICANT CHANGE UP (ref 0.2–1)
WBC # BLD: 8.33 K/UL — SIGNIFICANT CHANGE UP (ref 4.8–10.8)
WBC # FLD AUTO: 8.33 K/UL — SIGNIFICANT CHANGE UP (ref 4.8–10.8)

## 2024-04-20 PROCEDURE — 85014 HEMATOCRIT: CPT

## 2024-04-20 PROCEDURE — 93010 ELECTROCARDIOGRAM REPORT: CPT

## 2024-04-20 PROCEDURE — 83690 ASSAY OF LIPASE: CPT

## 2024-04-20 PROCEDURE — 99285 EMERGENCY DEPT VISIT HI MDM: CPT | Mod: 25

## 2024-04-20 PROCEDURE — 82803 BLOOD GASES ANY COMBINATION: CPT

## 2024-04-20 PROCEDURE — 85018 HEMOGLOBIN: CPT

## 2024-04-20 PROCEDURE — 96374 THER/PROPH/DIAG INJ IV PUSH: CPT

## 2024-04-20 PROCEDURE — 81003 URINALYSIS AUTO W/O SCOPE: CPT

## 2024-04-20 PROCEDURE — 71045 X-RAY EXAM CHEST 1 VIEW: CPT | Mod: 26

## 2024-04-20 PROCEDURE — 71045 X-RAY EXAM CHEST 1 VIEW: CPT

## 2024-04-20 PROCEDURE — 70450 CT HEAD/BRAIN W/O DYE: CPT | Mod: MC

## 2024-04-20 PROCEDURE — 82962 GLUCOSE BLOOD TEST: CPT

## 2024-04-20 PROCEDURE — 84132 ASSAY OF SERUM POTASSIUM: CPT

## 2024-04-20 PROCEDURE — 82010 KETONE BODYS QUAN: CPT

## 2024-04-20 PROCEDURE — 85025 COMPLETE CBC W/AUTO DIFF WBC: CPT

## 2024-04-20 PROCEDURE — 93005 ELECTROCARDIOGRAM TRACING: CPT

## 2024-04-20 PROCEDURE — 80053 COMPREHEN METABOLIC PANEL: CPT

## 2024-04-20 PROCEDURE — 84295 ASSAY OF SERUM SODIUM: CPT

## 2024-04-20 PROCEDURE — 0241U: CPT

## 2024-04-20 PROCEDURE — 70450 CT HEAD/BRAIN W/O DYE: CPT | Mod: 26,MC

## 2024-04-20 PROCEDURE — 80048 BASIC METABOLIC PNL TOTAL CA: CPT

## 2024-04-20 PROCEDURE — 36415 COLL VENOUS BLD VENIPUNCTURE: CPT

## 2024-04-20 PROCEDURE — 83735 ASSAY OF MAGNESIUM: CPT

## 2024-04-20 PROCEDURE — 99451 NTRPROF PH1/NTRNET/EHR 5/>: CPT

## 2024-04-20 PROCEDURE — 82330 ASSAY OF CALCIUM: CPT

## 2024-04-20 PROCEDURE — 83605 ASSAY OF LACTIC ACID: CPT

## 2024-04-20 PROCEDURE — 99285 EMERGENCY DEPT VISIT HI MDM: CPT

## 2024-04-20 RX ORDER — SODIUM CHLORIDE 9 MG/ML
1000 INJECTION INTRAMUSCULAR; INTRAVENOUS; SUBCUTANEOUS ONCE
Refills: 0 | Status: COMPLETED | OUTPATIENT
Start: 2024-04-20 | End: 2024-04-20

## 2024-04-20 RX ORDER — METOCLOPRAMIDE HCL 10 MG
10 TABLET ORAL ONCE
Refills: 0 | Status: COMPLETED | OUTPATIENT
Start: 2024-04-20 | End: 2024-04-20

## 2024-04-20 RX ADMIN — Medication 104 MILLIGRAM(S): at 17:25

## 2024-04-20 RX ADMIN — SODIUM CHLORIDE 1000 MILLILITER(S): 9 INJECTION INTRAMUSCULAR; INTRAVENOUS; SUBCUTANEOUS at 17:25

## 2024-04-20 RX ADMIN — SODIUM CHLORIDE 1000 MILLILITER(S): 9 INJECTION INTRAMUSCULAR; INTRAVENOUS; SUBCUTANEOUS at 19:27

## 2024-04-20 RX ADMIN — SODIUM CHLORIDE 1000 MILLILITER(S): 9 INJECTION INTRAMUSCULAR; INTRAVENOUS; SUBCUTANEOUS at 18:24

## 2024-04-20 NOTE — ED PROVIDER NOTE - CLINICAL SUMMARY MEDICAL DECISION MAKING FREE TEXT BOX
55 yo M presented to ED with nausea, vomiting and diarrhea since last night. Labs and EKG were ordered and reviewed.  Imaging was ordered and reviewed by me.  Appropriate medications for patient's presenting complaints were ordered and effects were reassessed. Likely viral gastroenteritis.  Patient's records (prior hospital, ED visit, and/or nursing home notes if available) were reviewed.  Additional history was obtained from EMS, family, and/or PCP (where available).  Escalation to admission/observation was considered.   However patient feels much better and is comfortable with discharge.  Appropriate follow-up was arranged. Return precautions discussed in detail.

## 2024-04-20 NOTE — ED PROVIDER NOTE - CARE PROVIDER_API CALL
[address], [place] [address] [address]  Phone: [phone]  Fax: ([phone]  Follow Up Time: 1-3 Days

## 2024-04-20 NOTE — ED PROVIDER NOTE - NS ED ATTENDING STATEMENT MOD
I have seen and examined this patient and fully participated in the care of this patient as the teaching attending.  The service was shared with the GAUDENCIO.  I reviewed and verified the documentation.

## 2024-04-20 NOTE — ED PROVIDER NOTE - OBJECTIVE STATEMENT
53 y/o male presents to the Ed for evaluation of right sided headache, nausea, vomiting and diarrhea over past few days. patient unable to tolerate his home medications. patient with hx of NIDDM, HTN, not on anticoagulation. no fevers. no sick contacts. no back pain . no chest pain , sob, palpitations. no dizziness, tinnitus, slurred speech, visual changes.

## 2024-04-20 NOTE — ED PROVIDER NOTE - PROGRESS NOTE DETAILS
Authored by Alycia Nicholson, DO: Pt feeling much better at this time. Tolerating PO in the ED. Patient and wife updated with results. They feel comfortable going home at this time, will return if sx return or will f/u with pmd Dr. Vinh Barnett in [place].

## 2024-04-20 NOTE — CONSULT NOTE ADULT - ASSESSMENT
63 yo M, diabetes and htn and hld, who presents for nausea, vomiting, and diarrhea, with ED team concerned for euglycemic DKA in setting of canagaflozin use. No reported overdose at this time. Vitals non-actionable. Labs show VBG pH 7.34, pCO2 40, lactate 3.2. BMP Na 134, bicarb 20, AG 21, glucose 147 and BHB 4.4.    The patient appears to have ketosis, however there is no significant acidosis which suggests this patient does not have euglycemic DKA that can be caused by canagliflozin. The patient's GI symptoms, dehydration, and not tolerating oral intake today are all explanations for his ketosis.     #Recommendations  - Supportive care for his symptoms per medical team  - Controlling his symptoms to allow oral carbohydrate into will likely help his ketosis  - Okay to continue his home medications  - Further medical and/or psychiatric care per primary team    Thank you for involving us in the care of this patient. Assessment and plan discussed with toxicology attending Dr. Ben William. Please do not hesitate to reach out to the toxicology team for any further questions or concerns.    The On-Call Toxicology Fellow can be reached 24/7 via Pager #[phone]  Please send a 10 digit call back # as [facility] cover multiple hospitals    Antonino Osborne MD  Toxicology Fellow  PGY-5

## 2024-04-20 NOTE — CONSULT NOTE ADULT - SUBJECTIVE AND OBJECTIVE BOX
MEDICAL TOXICOLOGY CONSULT    HPI: 65 yo M, diabetes and htn and hld, who presents for   several days of nausea, vomiting, and diarrhea. Toxicology is consulted for euglycemic DKA in setting of canagliflozin use. No reported overdose at this time. Other meds include atorvastatin, losartan    ONSET / TIME of exposure(s): none    QUANTITY of exposure(s): none    ROUTE of exposure:  INGESTION     CONTEXT of exposure: at home      ASSOCIATED symptoms: nausea, emesis, diarrhea    PAST MEDICAL & SURGICAL HISTORY:  DM (diabetes mellitus)      Hyperlipidemia      No significant past surgical history          MEDICATION HISTORY:  sodium chloride 0.9% Bolus 1000 milliLiter(s) IV Bolus once      FAMILY HISTORY:      REVIEW OF SYSTEMS:   _____unable to perform due to intoxication, dementia, or illness      Vital Signs Last 24 Hrs  T(C): 36.8 (20 Apr 2024 16:47), Max: 36.8 (20 Apr 2024 16:47)  T(F): 98.3 (20 Apr 2024 16:47), Max: 98.3 (20 Apr 2024 16:47)  HR: 92 (20 Apr 2024 16:47) (92 - 92)  BP: 140/71 (20 Apr 2024 16:47) (140/71 - 140/71)  BP(mean): --  RR: 20 (20 Apr 2024 16:47) (20 - 20)  SpO2: 99% (20 Apr 2024 16:47) (99% - 99%)    Parameters below as of 20 Apr 2024 16:47  Patient On (Oxygen Delivery Method): room air        SIGNIFICANT LABORATORY STUDIES:                        13.8   8.33  )-----------( 215      ( 20 Apr 2024 17:30 )             45.1       04-20    134<L>  |  93<L>  |  17  ----------------------------<  147<H>  4.0   |  20  |  0.8    Ca    9.5      20 Apr 2024 17:30  Mg     2.2     04-20    TPro  7.3  /  Alb  4.7  /  TBili  0.9  /  DBili  x   /  AST  14  /  ALT  26  /  AlkPhos  64  04-20          Urinalysis Basic - ( 20 Apr 2024 17:30 )    Color: x / Appearance: x / SG: x / pH: x  Gluc: 147 mg/dL / Ketone: x  / Bili: x / Urobili: x   Blood: x / Protein: x / Nitrite: x   Leuk Esterase: x / RBC: x / WBC x   Sq Epi: x / Non Sq Epi: x / Bacteria: x        Anion Gap: 21<H> 04-20 @ 17:30  CK: -- 04-20 @ 17:30  Troponin:  --  04-20 @ 17:30  Pro-BNP:  --  04-20 @ 17:30  VBG:  --  04-20 @ 17:30  Carboxyhemoglobin %:  --  04-20 @ 17:30  Methemoglobin %:  --  04-20 @ 17:30  Osmolality Serum:  --  04-20 @ 17:30  Aspirin Level: --  04-20 @ 17:30  Acetaminophen Level:  --  04-20 @ 17:30  Ethanol Level:  --  04-20 @ 17:30  Digoxin Level:  --  04-20 @ 17:30  Phenytoin Level:  --  04-20 @ 17:30  Carbamazepine level:  --  04-20 @ 17:30  Lamotrigine level:  --  04-20 @ 17:30

## 2024-04-20 NOTE — ED PROVIDER NOTE - CONSULTANT FREE TEXT FOR MDM DISCUSSED CASE WITH QUESTION
toxicology consulted for r/o euglycemic DKA (elevated b-hydroxy and pt on -flozin), less likely because no acidosis.

## 2024-04-20 NOTE — ED ADULT NURSE NOTE - MODE OF DISCHARGE

## 2024-04-20 NOTE — ED ADULT TRIAGE NOTE - CHIEF COMPLAINT QUOTE
headache; weakness not feeling well the last two days; patient states not compliant with home medications; including diabetic medication

## 2024-04-20 NOTE — ED PROVIDER NOTE - PHYSICAL EXAMINATION
general: well appearing, no distress  eyes: clear conjunctiva, no sinus tenderness to percussion  cardiac: no murmurs, extrasystole, regular rate, regular rhythm  resp: clear throughout all lung fields, no respiratory distress  abdomen: no CVA tenderness, BS x 4, non tender, no distention, no rashes, no rebound or guarding  msk: pelvis stable, gait steady, neck supple  skin: no rashes, swelling, bruising

## 2024-04-20 NOTE — ED PROVIDER NOTE - PATIENT PORTAL LINK FT
You can access the FollowMyHealth Patient Portal offered by [facility] by registering at the following website: [email or web address]. By joining [employer]’s FollowMyHealth portal, you will also be able to view your health information using other applications (apps) compatible with our system.

## 2024-04-20 NOTE — ED PROVIDER NOTE - DIFFERENTIAL DIAGNOSIS
viral gastroenteritis, flu, covid, migraine, tension headache, intracranial pathology Differential Diagnosis

## 2024-04-21 NOTE — ED POST DISCHARGE NOTE - DETAILS
spoke to wife , states her  little better , no cough, informed of radiology findings states will follow up with primary care doctor